# Patient Record
Sex: MALE | Race: WHITE | NOT HISPANIC OR LATINO | Employment: FULL TIME | URBAN - METROPOLITAN AREA
[De-identification: names, ages, dates, MRNs, and addresses within clinical notes are randomized per-mention and may not be internally consistent; named-entity substitution may affect disease eponyms.]

---

## 2017-04-24 ENCOUNTER — APPOINTMENT (EMERGENCY)
Dept: CT IMAGING | Facility: HOSPITAL | Age: 62
End: 2017-04-24
Payer: COMMERCIAL

## 2017-04-24 ENCOUNTER — HOSPITAL ENCOUNTER (EMERGENCY)
Facility: HOSPITAL | Age: 62
Discharge: HOME | End: 2017-04-24
Admitting: EMERGENCY MEDICINE
Payer: COMMERCIAL

## 2017-04-24 VITALS
WEIGHT: 190 LBS | BODY MASS INDEX: 25.18 KG/M2 | HEART RATE: 76 BPM | RESPIRATION RATE: 20 BRPM | HEIGHT: 73 IN | OXYGEN SATURATION: 99 % | TEMPERATURE: 96.9 F | SYSTOLIC BLOOD PRESSURE: 167 MMHG | DIASTOLIC BLOOD PRESSURE: 77 MMHG

## 2017-04-24 DIAGNOSIS — R11.2 NAUSEA AND VOMITING: ICD-10-CM

## 2017-04-24 DIAGNOSIS — R80.9 PROTEINURIA: ICD-10-CM

## 2017-04-24 DIAGNOSIS — R74.01 TRANSAMINITIS: ICD-10-CM

## 2017-04-24 DIAGNOSIS — N39.0 UTI (URINARY TRACT INFECTION): Primary | ICD-10-CM

## 2017-04-24 LAB
ALBUMIN SERPL BCP-MCNC: 3.9 G/DL (ref 3.5–5)
ALP SERPL-CCNC: 90 U/L (ref 46–116)
ALT SERPL W P-5'-P-CCNC: 116 U/L (ref 12–78)
ANION GAP SERPL CALCULATED.3IONS-SCNC: 15 MMOL/L (ref 4–13)
AST SERPL W P-5'-P-CCNC: 102 U/L (ref 5–45)
BASOPHILS # BLD AUTO: 0.04 THOUSANDS/ΜL (ref 0–0.1)
BASOPHILS NFR BLD AUTO: 0 % (ref 0–1)
BILIRUB SERPL-MCNC: 1.6 MG/DL (ref 0.2–1)
BUN SERPL-MCNC: 11 MG/DL (ref 5–25)
CALCIUM SERPL-MCNC: 9.3 MG/DL (ref 8.3–10.1)
CHLORIDE SERPL-SCNC: 100 MMOL/L (ref 100–108)
CLARITY, POC: CLEAR
CO2 SERPL-SCNC: 24 MMOL/L (ref 21–32)
COLOR, POC: YELLOW
CREAT SERPL-MCNC: 1.34 MG/DL (ref 0.6–1.3)
EOSINOPHIL # BLD AUTO: 0 THOUSAND/ΜL (ref 0–0.61)
EOSINOPHIL NFR BLD AUTO: 0 % (ref 0–6)
ERYTHROCYTE [DISTWIDTH] IN BLOOD BY AUTOMATED COUNT: 14.5 % (ref 11.6–15.1)
EXT BILIRUBIN, UA: NEGATIVE
EXT BLOOD URINE: NEGATIVE
EXT GLUCOSE, UA: NEGATIVE
EXT KETONES: NORMAL
EXT NITRITE, UA: NEGATIVE
EXT PH, UA: 8.5
EXT PROTEIN, UA: NORMAL
EXT SPECIFIC GRAVITY, UA: 1
EXT UROBILINOGEN: 0.2
GFR SERPL CREATININE-BSD FRML MDRD: 54.6 ML/MIN/1.73SQ M
GLUCOSE SERPL-MCNC: 144 MG/DL (ref 65–140)
HCT VFR BLD AUTO: 47.7 % (ref 36.5–49.3)
HGB BLD-MCNC: 16.7 G/DL (ref 12–17)
LIPASE SERPL-CCNC: 184 U/L (ref 73–393)
LYMPHOCYTES # BLD AUTO: 0.74 THOUSANDS/ΜL (ref 0.6–4.47)
LYMPHOCYTES NFR BLD AUTO: 7 % (ref 14–44)
MCH RBC QN AUTO: 31.9 PG (ref 26.8–34.3)
MCHC RBC AUTO-ENTMCNC: 35 G/DL (ref 31.4–37.4)
MCV RBC AUTO: 91 FL (ref 82–98)
MONOCYTES # BLD AUTO: 0.55 THOUSAND/ΜL (ref 0.17–1.22)
MONOCYTES NFR BLD AUTO: 6 % (ref 4–12)
NEUTROPHILS # BLD AUTO: 8.65 THOUSANDS/ΜL (ref 1.85–7.62)
NEUTS SEG NFR BLD AUTO: 87 % (ref 43–75)
PLATELET # BLD AUTO: 284 THOUSANDS/UL (ref 149–390)
PMV BLD AUTO: 9.4 FL (ref 8.9–12.7)
POTASSIUM SERPL-SCNC: 3.5 MMOL/L (ref 3.5–5.3)
PROT SERPL-MCNC: 8.6 G/DL (ref 6.4–8.2)
RBC # BLD AUTO: 5.23 MILLION/UL (ref 3.88–5.62)
SODIUM SERPL-SCNC: 139 MMOL/L (ref 136–145)
WBC # BLD AUTO: 9.98 THOUSAND/UL (ref 4.31–10.16)
WBC # BLD EST: NORMAL 10*3/UL

## 2017-04-24 PROCEDURE — 83690 ASSAY OF LIPASE: CPT | Performed by: EMERGENCY MEDICINE

## 2017-04-24 PROCEDURE — 99284 EMERGENCY DEPT VISIT MOD MDM: CPT

## 2017-04-24 PROCEDURE — 85025 COMPLETE CBC W/AUTO DIFF WBC: CPT | Performed by: EMERGENCY MEDICINE

## 2017-04-24 PROCEDURE — 74177 CT ABD & PELVIS W/CONTRAST: CPT

## 2017-04-24 PROCEDURE — 81002 URINALYSIS NONAUTO W/O SCOPE: CPT | Performed by: PHYSICIAN ASSISTANT

## 2017-04-24 PROCEDURE — 96361 HYDRATE IV INFUSION ADD-ON: CPT

## 2017-04-24 PROCEDURE — 96374 THER/PROPH/DIAG INJ IV PUSH: CPT

## 2017-04-24 PROCEDURE — 36415 COLL VENOUS BLD VENIPUNCTURE: CPT | Performed by: EMERGENCY MEDICINE

## 2017-04-24 PROCEDURE — 93005 ELECTROCARDIOGRAM TRACING: CPT | Performed by: PHYSICIAN ASSISTANT

## 2017-04-24 PROCEDURE — 80053 COMPREHEN METABOLIC PANEL: CPT | Performed by: EMERGENCY MEDICINE

## 2017-04-24 RX ORDER — ONDANSETRON 4 MG/1
4 TABLET, ORALLY DISINTEGRATING ORAL EVERY 8 HOURS PRN
Qty: 15 TABLET | Refills: 0 | Status: SHIPPED | OUTPATIENT
Start: 2017-04-24 | End: 2018-02-02

## 2017-04-24 RX ORDER — ONDANSETRON 2 MG/ML
4 INJECTION INTRAMUSCULAR; INTRAVENOUS ONCE AS NEEDED
Status: COMPLETED | OUTPATIENT
Start: 2017-04-24 | End: 2017-04-24

## 2017-04-24 RX ORDER — CIPROFLOXACIN 500 MG/1
500 TABLET, FILM COATED ORAL 2 TIMES DAILY
Qty: 14 TABLET | Refills: 0 | Status: SHIPPED | OUTPATIENT
Start: 2017-04-24 | End: 2017-05-01

## 2017-04-24 RX ADMIN — ONDANSETRON 4 MG: 2 INJECTION INTRAMUSCULAR; INTRAVENOUS at 12:25

## 2017-04-24 RX ADMIN — SODIUM CHLORIDE 500 ML: 0.9 INJECTION, SOLUTION INTRAVENOUS at 12:25

## 2017-04-24 RX ADMIN — IODIXANOL 100 ML: 320 INJECTION, SOLUTION INTRAVASCULAR at 14:01

## 2017-04-24 RX ADMIN — SODIUM CHLORIDE 1000 ML: 0.9 INJECTION, SOLUTION INTRAVENOUS at 12:25

## 2017-04-25 LAB
ATRIAL RATE: 97 BPM
P AXIS: 80 DEGREES
PR INTERVAL: 170 MS
QRS AXIS: 76 DEGREES
QRSD INTERVAL: 100 MS
QT INTERVAL: 394 MS
QTC INTERVAL: 500 MS
T WAVE AXIS: 82 DEGREES
VENTRICULAR RATE: 97 BPM

## 2018-02-02 ENCOUNTER — HOSPITAL ENCOUNTER (INPATIENT)
Facility: HOSPITAL | Age: 63
LOS: 7 days | Discharge: OTHER INSTITUTION | DRG: 896 | End: 2018-02-10
Attending: EMERGENCY MEDICINE | Admitting: INTERNAL MEDICINE
Payer: COMMERCIAL

## 2018-02-02 DIAGNOSIS — E87.6 HYPOKALEMIA: ICD-10-CM

## 2018-02-02 DIAGNOSIS — F32.A DEPRESSION: ICD-10-CM

## 2018-02-02 DIAGNOSIS — F10.239 ALCOHOL WITHDRAWAL (HCC): Primary | ICD-10-CM

## 2018-02-02 DIAGNOSIS — F10.230 ALCOHOL WITHDRAWAL SYNDROME WITHOUT COMPLICATION (HCC): ICD-10-CM

## 2018-02-02 DIAGNOSIS — K59.00 CONSTIPATION: ICD-10-CM

## 2018-02-02 DIAGNOSIS — R74.8 ELEVATED LIVER ENZYMES: ICD-10-CM

## 2018-02-02 DIAGNOSIS — R51.9 HEADACHE: ICD-10-CM

## 2018-02-02 LAB
ALBUMIN SERPL BCP-MCNC: 3.5 G/DL (ref 3.5–5)
ALP SERPL-CCNC: 110 U/L (ref 46–116)
ALT SERPL W P-5'-P-CCNC: 130 U/L (ref 12–78)
AMPHETAMINES SERPL QL SCN: NEGATIVE
ANION GAP SERPL CALCULATED.3IONS-SCNC: 12 MMOL/L (ref 4–13)
AST SERPL W P-5'-P-CCNC: 207 U/L (ref 5–45)
BARBITURATES UR QL: NEGATIVE
BASOPHILS # BLD AUTO: 0.04 THOUSANDS/ΜL (ref 0–0.1)
BASOPHILS NFR BLD AUTO: 1 % (ref 0–1)
BENZODIAZ UR QL: NEGATIVE
BILIRUB SERPL-MCNC: 1.85 MG/DL (ref 0.2–1)
BUN SERPL-MCNC: 8 MG/DL (ref 5–25)
CALCIUM SERPL-MCNC: 8.9 MG/DL (ref 8.3–10.1)
CHLORIDE SERPL-SCNC: 103 MMOL/L (ref 100–108)
CO2 SERPL-SCNC: 24 MMOL/L (ref 21–32)
COCAINE UR QL: NEGATIVE
CREAT SERPL-MCNC: 1.08 MG/DL (ref 0.6–1.3)
EOSINOPHIL # BLD AUTO: 0 THOUSAND/ΜL (ref 0–0.61)
EOSINOPHIL NFR BLD AUTO: 0 % (ref 0–6)
ERYTHROCYTE [DISTWIDTH] IN BLOOD BY AUTOMATED COUNT: 13.9 % (ref 11.6–15.1)
ETHANOL EXG-MCNC: 0 MG/DL
GFR SERPL CREATININE-BSD FRML MDRD: 74 ML/MIN/1.73SQ M
GLUCOSE SERPL-MCNC: 112 MG/DL (ref 65–140)
HCT VFR BLD AUTO: 43.5 % (ref 36.5–49.3)
HGB BLD-MCNC: 15.7 G/DL (ref 12–17)
LYMPHOCYTES # BLD AUTO: 0.44 THOUSANDS/ΜL (ref 0.6–4.47)
LYMPHOCYTES NFR BLD AUTO: 7 % (ref 14–44)
MAGNESIUM SERPL-MCNC: 1.8 MG/DL (ref 1.6–2.6)
MCH RBC QN AUTO: 34.4 PG (ref 26.8–34.3)
MCHC RBC AUTO-ENTMCNC: 36.1 G/DL (ref 31.4–37.4)
MCV RBC AUTO: 95 FL (ref 82–98)
METHADONE UR QL: NEGATIVE
MONOCYTES # BLD AUTO: 1.22 THOUSAND/ΜL (ref 0.17–1.22)
MONOCYTES NFR BLD AUTO: 18 % (ref 4–12)
NEUTROPHILS # BLD AUTO: 4.97 THOUSANDS/ΜL (ref 1.85–7.62)
NEUTS SEG NFR BLD AUTO: 74 % (ref 43–75)
NRBC BLD AUTO-RTO: 0 /100 WBCS
OPIATES UR QL SCN: NEGATIVE
PCP UR QL: NEGATIVE
PHOSPHATE SERPL-MCNC: 1.8 MG/DL (ref 2.3–4.1)
PLATELET # BLD AUTO: 160 THOUSANDS/UL (ref 149–390)
PMV BLD AUTO: 9.7 FL (ref 8.9–12.7)
POTASSIUM SERPL-SCNC: 3.7 MMOL/L (ref 3.5–5.3)
PROT SERPL-MCNC: 8.1 G/DL (ref 6.4–8.2)
RBC # BLD AUTO: 4.57 MILLION/UL (ref 3.88–5.62)
SODIUM SERPL-SCNC: 139 MMOL/L (ref 136–145)
THC UR QL: NEGATIVE
WBC # BLD AUTO: 6.7 THOUSAND/UL (ref 4.31–10.16)

## 2018-02-02 PROCEDURE — 83735 ASSAY OF MAGNESIUM: CPT | Performed by: STUDENT IN AN ORGANIZED HEALTH CARE EDUCATION/TRAINING PROGRAM

## 2018-02-02 PROCEDURE — 99284 EMERGENCY DEPT VISIT MOD MDM: CPT

## 2018-02-02 PROCEDURE — 96361 HYDRATE IV INFUSION ADD-ON: CPT

## 2018-02-02 PROCEDURE — 99220 PR INITIAL OBSERVATION CARE/DAY 70 MINUTES: CPT | Performed by: INTERNAL MEDICINE

## 2018-02-02 PROCEDURE — 85025 COMPLETE CBC W/AUTO DIFF WBC: CPT | Performed by: STUDENT IN AN ORGANIZED HEALTH CARE EDUCATION/TRAINING PROGRAM

## 2018-02-02 PROCEDURE — 84100 ASSAY OF PHOSPHORUS: CPT | Performed by: STUDENT IN AN ORGANIZED HEALTH CARE EDUCATION/TRAINING PROGRAM

## 2018-02-02 PROCEDURE — 36415 COLL VENOUS BLD VENIPUNCTURE: CPT | Performed by: STUDENT IN AN ORGANIZED HEALTH CARE EDUCATION/TRAINING PROGRAM

## 2018-02-02 PROCEDURE — 82075 ASSAY OF BREATH ETHANOL: CPT | Performed by: EMERGENCY MEDICINE

## 2018-02-02 PROCEDURE — 93005 ELECTROCARDIOGRAM TRACING: CPT

## 2018-02-02 PROCEDURE — 80053 COMPREHEN METABOLIC PANEL: CPT | Performed by: STUDENT IN AN ORGANIZED HEALTH CARE EDUCATION/TRAINING PROGRAM

## 2018-02-02 PROCEDURE — 80307 DRUG TEST PRSMV CHEM ANLYZR: CPT | Performed by: EMERGENCY MEDICINE

## 2018-02-02 PROCEDURE — 96374 THER/PROPH/DIAG INJ IV PUSH: CPT

## 2018-02-02 RX ORDER — CHLORDIAZEPOXIDE HYDROCHLORIDE 25 MG/1
25 CAPSULE, GELATIN COATED ORAL EVERY 6 HOURS PRN
Status: DISCONTINUED | OUTPATIENT
Start: 2018-02-02 | End: 2018-02-03

## 2018-02-02 RX ORDER — DEXTROSE AND SODIUM CHLORIDE 5; .9 G/100ML; G/100ML
125 INJECTION, SOLUTION INTRAVENOUS CONTINUOUS
Status: DISCONTINUED | OUTPATIENT
Start: 2018-02-02 | End: 2018-02-06

## 2018-02-02 RX ORDER — DIAZEPAM 5 MG/ML
5 INJECTION, SOLUTION INTRAMUSCULAR; INTRAVENOUS ONCE
Status: COMPLETED | OUTPATIENT
Start: 2018-02-02 | End: 2018-02-02

## 2018-02-02 RX ORDER — ONDANSETRON 2 MG/ML
4 INJECTION INTRAMUSCULAR; INTRAVENOUS EVERY 6 HOURS PRN
Status: DISCONTINUED | OUTPATIENT
Start: 2018-02-02 | End: 2018-02-10 | Stop reason: HOSPADM

## 2018-02-02 RX ORDER — ACETAMINOPHEN 325 MG/1
650 TABLET ORAL EVERY 6 HOURS PRN
Status: DISCONTINUED | OUTPATIENT
Start: 2018-02-02 | End: 2018-02-10 | Stop reason: HOSPADM

## 2018-02-02 RX ORDER — SIMETHICONE 80 MG
80 TABLET,CHEWABLE ORAL 4 TIMES DAILY PRN
Status: DISCONTINUED | OUTPATIENT
Start: 2018-02-02 | End: 2018-02-10 | Stop reason: HOSPADM

## 2018-02-02 RX ORDER — METOPROLOL TARTRATE 5 MG/5ML
5 INJECTION INTRAVENOUS EVERY 6 HOURS PRN
Status: DISCONTINUED | OUTPATIENT
Start: 2018-02-02 | End: 2018-02-10 | Stop reason: HOSPADM

## 2018-02-02 RX ORDER — CHLORDIAZEPOXIDE HYDROCHLORIDE 25 MG/1
25 CAPSULE, GELATIN COATED ORAL EVERY 6 HOURS SCHEDULED
Status: DISCONTINUED | OUTPATIENT
Start: 2018-02-02 | End: 2018-02-03

## 2018-02-02 RX ORDER — THIAMINE MONONITRATE (VIT B1) 100 MG
100 TABLET ORAL ONCE
Status: COMPLETED | OUTPATIENT
Start: 2018-02-02 | End: 2018-02-02

## 2018-02-02 RX ORDER — LANOLIN ALCOHOL/MO/W.PET/CERES
400 CREAM (GRAM) TOPICAL ONCE
Status: COMPLETED | OUTPATIENT
Start: 2018-02-02 | End: 2018-02-02

## 2018-02-02 RX ORDER — DIAZEPAM 5 MG/1
5 TABLET ORAL ONCE
Status: DISCONTINUED | OUTPATIENT
Start: 2018-02-02 | End: 2018-02-02

## 2018-02-02 RX ORDER — SENNOSIDES 8.8 MG/5ML
8.8 LIQUID ORAL DAILY
Status: DISCONTINUED | OUTPATIENT
Start: 2018-02-03 | End: 2018-02-10 | Stop reason: HOSPADM

## 2018-02-02 RX ORDER — FOLIC ACID 1 MG/1
1 TABLET ORAL DAILY
Status: DISCONTINUED | OUTPATIENT
Start: 2018-02-03 | End: 2018-02-10 | Stop reason: HOSPADM

## 2018-02-02 RX ADMIN — CHLORDIAZEPOXIDE HYDROCHLORIDE 25 MG: 25 CAPSULE ORAL at 23:37

## 2018-02-02 RX ADMIN — Medication 100 MG: at 14:59

## 2018-02-02 RX ADMIN — METOPROLOL TARTRATE 5 MG: 1 INJECTION, SOLUTION INTRAVENOUS at 20:30

## 2018-02-02 RX ADMIN — Medication 400 MCG: at 14:59

## 2018-02-02 RX ADMIN — SODIUM CHLORIDE 1000 ML: 0.9 INJECTION, SOLUTION INTRAVENOUS at 14:59

## 2018-02-02 RX ADMIN — CHLORDIAZEPOXIDE HYDROCHLORIDE 25 MG: 25 CAPSULE ORAL at 20:29

## 2018-02-02 RX ADMIN — POTASSIUM PHOSPHATE, MONOBASIC AND POTASSIUM PHOSPHATE, DIBASIC 30 MMOL: 224; 236 INJECTION, SOLUTION INTRAVENOUS at 18:10

## 2018-02-02 RX ADMIN — DIAZEPAM 5 MG: 5 INJECTION, SOLUTION INTRAMUSCULAR; INTRAVENOUS at 14:59

## 2018-02-02 RX ADMIN — ACETAMINOPHEN 650 MG: 325 TABLET, FILM COATED ORAL at 20:29

## 2018-02-02 RX ADMIN — DEXTROSE AND SODIUM CHLORIDE 125 ML/HR: 5; .9 INJECTION, SOLUTION INTRAVENOUS at 23:37

## 2018-02-02 NOTE — ED PROVIDER NOTES
History  Chief Complaint   Patient presents with    Psychiatric Evaluation     Pts family states that pt made a comment about blowing his head off to his best friend on Monday   were called and pt states that he made the comment out of anger  Per mother all firearms were taking out of the house however pt told EMS that there is still one in the house  Pt has been a heavy drinker over the last few weeks and denies detoxing at this time but is very shaky and anxious  Mother is concerned about liver enzymes due to drinking  This is a 56-year-old male with a past medical history of alcoholism who presents to the emergency room this afternoon with withdrawal like symptoms and recent history of suicidal ideation  Patient states that he has been on medical leave for the past three weeks because of his drinking and he failed a drug test last week testing positive for marijuana  Patient went into work today and they told him that he needed to complete another drug test so he went to [address] [place] [id number] for the drug test, which was normal but they noticed that he was shaking and with his history of drinking they decided it would be best to be seen in the emergency room  Patient states that his last drink was last evening sometime and he usually drinks 3-4 beers per night  However, the mom and the nephew who are at bedside state that he typically drinks until he passes out most nights  Patient states that he has never had seizures in the past but will often get the shakes because without drinking  Patient denies any medical history and does not take any medications  Patient answers yes to two of the four cage questions  in regards to the patient's suicidal ideation, he made a comment to his friend a few days ago that he wanted to "blow his brains out "   At that time, the  were called who came to evaluate the patient and the patient stated that he said this out of anger and did not mean it    The  searched his house at that time and found no weapons  Patient states that he gave all his guns to his son  The patient currently denies any suicidal ideations or homicidal ideations  Patient states that he has never attempted suicide in the past, he has never been admitted for mental health reasons, and no one close to home as ever completed suicide  None       History reviewed  No pertinent past medical history  History reviewed  No pertinent surgical history  History reviewed  No pertinent family history  I have reviewed and agree with the history as documented  Social History   Substance Use Topics    Smoking status: Former Smoker    Smokeless tobacco: Never Used    Alcohol use 1 2 oz/week     2 Cans of beer per week        Review of Systems   Constitutional: Negative for chills, diaphoresis and fever  HENT: Negative for congestion, rhinorrhea, sinus pressure and sore throat  Eyes: Negative for visual disturbance  Respiratory: Negative for cough, chest tightness and shortness of breath  Cardiovascular: Negative for chest pain  Gastrointestinal: Negative for abdominal pain, constipation, diarrhea, nausea and vomiting  Genitourinary: Negative for dysuria, frequency, hematuria and urgency  Musculoskeletal: Negative for arthralgias and myalgias  Skin: Negative for color change and rash  Neurological: Positive for tremors  Negative for dizziness, seizures, numbness and headaches  Psychiatric/Behavioral: Negative for behavioral problems, confusion and hallucinations  The patient is nervous/anxious          Physical Exam  ED Triage Vitals [02/02/18 1357]   Temperature Pulse Respirations Blood Pressure SpO2   98 7 °F (37 1 °C) (!) 114 20 (!) 162/101 98 %      Temp Source Heart Rate Source Patient Position - Orthostatic VS BP Location FiO2 (%)   Oral Monitor Lying Left arm --      Pain Score       No Pain           Orthostatic Vital Signs  Vitals:    02/02/18 1530 02/02/18 1810 02/02/18 1952 02/02/18 2008   BP: 138/89 154/98 (!) 172/108 (!) 186/99   Pulse: (!) 115 (!) 120 105 (!) 108   Patient Position - Orthostatic VS: Lying Lying  Lying       Physical Exam   Constitutional: He is oriented to person, place, and time  He appears well-developed and well-nourished  No distress  HENT:   Head: Normocephalic and atraumatic  Eyes: Conjunctivae are normal  Pupils are equal, round, and reactive to light  Cardiovascular: Normal rate, regular rhythm and normal heart sounds  Exam reveals no gallop and no friction rub  No murmur heard  Pulmonary/Chest: Effort normal and breath sounds normal  No respiratory distress  He has no wheezes  He has no rales  Abdominal: Soft  Bowel sounds are normal  He exhibits no distension  There is no tenderness  There is no guarding  Musculoskeletal: Normal range of motion  Neurological: He is alert and oriented to person, place, and time  He displays tremor  GCS eye subscore is 4  GCS verbal subscore is 5  GCS motor subscore is 6  Patient with a resting tremor and bilateral upper extremities  Tongue fasciculations also present  Skin: Skin is warm and dry  Psychiatric: He has a normal mood and affect  His behavior is normal    Nursing note and vitals reviewed        ED Medications  Medications   potassium phosphate 30 mmol in sodium chloride 0 9 % 250 mL infusion (30 mmol Intravenous New Bag 2/2/18 1810)   simethicone (MYLICON) chewable tablet 80 mg (not administered)   ondansetron (ZOFRAN) injection 4 mg (not administered)   senna 8 8 mg/5 mL oral syrup 8 8 mg (not administered)   acetaminophen (TYLENOL) tablet 650 mg (650 mg Oral Given 2/2/18 2029)   chlordiazePOXIDE (LIBRIUM) capsule 25 mg (25 mg Oral Given 2/2/18 2029)   chlordiazePOXIDE (LIBRIUM) capsule 25 mg (not administered)   metoprolol (LOPRESSOR) injection 5 mg (5 mg Intravenous Given 2/2/18 2030)   thiamine (VITAMIN B1) 100 mg in sodium chloride 0 9 % 50 mL IVPB (not administered)   folic acid (FOLVITE) tablet 1 mg (not administered)   dextrose 5 % and sodium chloride 0 9 % infusion (not administered)   sodium chloride 0 9 % bolus 1,000 mL (1,000 mL Intravenous New Bag 2/2/18 1459)   thiamine (VITAMIN B1) tablet 100 mg (100 mg Oral Given 4/1/91 6688)   folic acid (FOLVITE) tablet 400 mcg (400 mcg Oral Given 2/2/18 1459)   diazepam (VALIUM) injection 5 mg (5 mg Intravenous Given 2/2/18 1459)       Diagnostic Studies  Results Reviewed     Procedure Component Value Units Date/Time    Comprehensive metabolic panel [[id number]]  (Abnormal) Collected:  02/02/18 1454    Lab Status:  Final result Specimen:  Blood from Arm, Left Updated:  02/02/18 1525     Sodium 139 mmol/L      Potassium 3 7 mmol/L      Chloride 103 mmol/L      CO2 24 mmol/L      Anion Gap 12 mmol/L      BUN 8 mg/dL      Creatinine 1 08 mg/dL      Glucose 112 mg/dL      Calcium 8 9 mg/dL       (H) U/L       (H) U/L      Alkaline Phosphatase 110 U/L      Total Protein 8 1 g/dL      Albumin 3 5 g/dL      Total Bilirubin 1 85 (H) mg/dL      eGFR 74 ml/min/1 73sq m     Narrative:         National Kidney Disease Education Program recommendations are as follows:  GFR calculation is accurate only with a steady state creatinine  Chronic Kidney disease less than 60 ml/min/1 73 sq  meters  Kidney failure less than 15 ml/min/1 73 sq  meters      Phosphorus [[id number]]  (Abnormal) Collected:  02/02/18 1454    Lab Status:  Final result Specimen:  Blood from Arm, Left Updated:  02/02/18 1525     Phosphorus 1 8 (L) mg/dL     Magnesium [[id number]]  (Normal) Collected:  02/02/18 1454    Lab Status:  Final result Specimen:  Blood from Arm, Left Updated:  02/02/18 1525     Magnesium 1 8 mg/dL     CBC and differential [[id number]]  (Abnormal) Collected:  02/02/18 1454    Lab Status:  Final result Specimen:  Blood from Arm, Left Updated:  02/02/18 1505     WBC 6 70 Thousand/uL      RBC 4 57 Million/uL      Hemoglobin 15 7 g/dL Hematocrit 43 5 %      MCV 95 fL      MCH 34 4 (H) pg      MCHC 36 1 g/dL      RDW 13 9 %      MPV 9 7 fL      Platelets 336 Thousands/uL      nRBC 0 /100 WBCs      Neutrophils Relative 74 %      Lymphocytes Relative 7 (L) %      Monocytes Relative 18 (H) %      Eosinophils Relative 0 %      Basophils Relative 1 %      Neutrophils Absolute 4 97 Thousands/µL      Lymphocytes Absolute 0 44 (L) Thousands/µL      Monocytes Absolute 1 22 Thousand/µL      Eosinophils Absolute 0 00 Thousand/µL      Basophils Absolute 0 04 Thousands/µL     Rapid drug screen, urine [[id number]]  (Normal) Collected:  02/02/18 1411    Lab Status:  Final result Specimen:  Urine from Urine, Other Updated:  02/02/18 1431     Amph/Meth UR Negative     Barbiturate Ur Negative     Benzodiazepine Urine Negative     Cocaine Urine Negative     Methadone Urine Negative     Opiate Urine Negative     PCP Ur Negative     THC Urine Negative    Narrative:         FOR MEDICAL PURPOSES ONLY  IF CONFIRMATION NEEDED PLEASE CONTACT THE LAB WITHIN 5 DAYS      Drug Screen Cutoff Levels:  AMPHETAMINE/METHAMPHETAMINES  1000 ng/mL  BARBITURATES     200 ng/mL  BENZODIAZEPINES     200 ng/mL  COCAINE      300 ng/mL  METHADONE      300 ng/mL  OPIATES      300 ng/mL  PHENCYCLIDINE     25 ng/mL  THC       50 ng/mL    POCT alcohol breath test [[id number]]  (Normal) Resulted:  02/02/18 1403    Lab Status:  Final result Updated:  02/02/18 1404     EXTBreath Alcohol 0 000                 No orders to display         Procedures  ECG 12 Lead Documentation  Date/Time: 2/2/2018 10:30 PM  Performed by: Magaly Gauthier  Authorized by: Sanchez Keene     Indications / Diagnosis:  Alcohol withdrawal  ECG reviewed by me, the ED Provider: yes    Patient location:  ED  Previous ECG:     Previous ECG:  Compared to current    Similarity:  No change    Comparison to cardiac monitor: No    Interpretation:     Interpretation: normal    Quality:     Tracing quality:  Limited by artifact  Rate:     ECG rate assessment: normal    Rhythm:     Rhythm: sinus rhythm    Ectopy:     Ectopy: none    QRS:     QRS axis:  Normal    QRS intervals:  Normal  Conduction:     Conduction: normal    ST segments:     ST segments:  Normal  T waves:     T waves: normal            Phone Consults  ED Phone Contact    ED Course  ED Course                                MDM  Number of Diagnoses or Management Options  Alcohol withdrawal [facility]):   Diagnosis management comments: Patient's CIWA score 9-10, given 5mg valium IV and saw a large improvement in his symptoms  Patient given folate and thiamine supplements po and his phosphorus was repleted with [facility] IVPB  Spoke with SLIM who agreed to admit the patient as an obs  CritCare Time    Disposition  Final diagnoses:   Alcohol withdrawal ([facility] [address] )     Time reflects when diagnosis was documented in both MDM as applicable and the Disposition within this note     Time User Action Codes Description Comment    2/2/2018  4:14 PM Evelin Killian Add [F10 239] Alcohol withdrawal ([facility] [address] )     2/2/2018  4:48 PM Leanne Sutherland Add [F32 9] Depression       ED Disposition     ED Disposition Condition Comment    Admit  Case was discussed with ELOISA and the patient's admission status was agreed to be Admission Status: observation status to the service of Dr Mary Cosme  Follow-up Information    None       There are no discharge medications for this patient  No discharge procedures on file  ED Provider  Attending physically available and evaluated Nickolas Weldon I managed the patient along with the ED Attending      Electronically Signed by         Dustin Young MD  02/02/18 7878       Dustin Young MD  02/02/18 7393

## 2018-02-02 NOTE — ED ATTENDING ATTESTATION
Valentine Morton MD, saw and evaluated the patient  I have discussed the patient with the resident/non-physician practitioner and agree with the resident's/non-physician practitioner's findings, Plan of Care, and MDM as documented in the resident's/non-physician practitioner's note, except where noted  All available labs and Radiology studies were reviewed  At this point I agree with the current assessment done in the Emergency Department  I have conducted an independent evaluation of this patient a history and physical is as follows:      Critical Care Time  CritCare Time    Procedures     64 yo male stated he wanted to blow his head off five days ago and  came to house and pt had no guns and pt stated he was just angry  Pt now put on medical leave for alcohol abuse and marijauna use  Pt went for drug test today at work and was shaky  Pt with last alcohol use yesterday  Pt sent in for shakiness  No si or hi currently  No pmh  Pt with depression  Pt here with his mother and nephew  Pt   Vss, tachy, anxious, shaking tremor, lungs cta, rrr, abdomen soft nontender, no neuro deficits    Labs, ivf, benzos

## 2018-02-02 NOTE — ED NOTES
Pt has 2 other family members in the waiting room that state that pt is very forgetful and that they would like to be included to make sure that pt is truthful about what is going on  Pt states that family brought him here after failing a drug test for work at [employer] [facility] [employer] [facility] [address]        Mark Robbins RN  02/02/18 1400

## 2018-02-02 NOTE — ASSESSMENT & PLAN NOTE
· Will start her on a clock Librium with p r n   · IV beta-blocker as needed  · IV fluids  · thiamine and folate  · Will get psychiatry evaluation for alcohol withdrawal counseling also patient with evidence of depression and suicidal ideation last week    Currently denies any suicidal ideations to me

## 2018-02-02 NOTE — H&P
H&P- Rita Arias 1955, 63 y o  male MRN: [id number]    Unit/Bed#: Femi Berry Encounter: [id number]    Primary Care Provider: Omid Flynn DO   Date and time admitted to hospital: 2/2/2018  1:39 PM        * Alcohol withdrawal syndrome without complication ([facility] [address] )   Assessment & Plan    · Will start her on a clock Librium with p r n   · IV beta-blocker as needed  · IV fluids  · thiamine and folate  · Will get psychiatry evaluation for alcohol withdrawal counseling also patient with evidence of depression and suicidal ideation last week  Currently denies any suicidal ideations to me          VTE Prophylaxis: Low risk  / reason for no mechanical VTE prophylaxis Low risk   Code Status:  Full code  POLST: POLST form is not discussed and not completed at this time  Discussion with family:  Patient's mother at bedside    Anticipated Length of Stay:  Patient will be admitted on an Observation basis with an anticipated length of stay of  less than 2 midnights  Justification for Hospital Stay:  Patient to be treated for acute alcohol withdrawal syndrome possible discharge in a   if stable    Total Time for Visit, including Counseling / Coordination of Care: 30 minutes  Greater than 50% of this total time spent on direct patient counseling and coordination of care  Chief Complaint:   My job sent me here    History of Present Illness:    Rita Arias is a 63 y o  male who presents with alcohol withdrawal   Patient is at work and was randomly drug tested with positive urine tox for marijuana  He was sent to [facility] was noted to be tremulous and diaphoretic  Patient admits to drinking at least 4 beers a day patient's mother reports he also drinks hard liquor  Patient has had no alcohol for at least 36 hours  He denies feeling tremulous but was given Valium in the ER for tremors and is feeling better at this time    Patient also reportedly expressed suicidal ideation but a week ago however he is denying any suicidal homicidal ideation at this time    Review of Systems:    Review of Systems   Constitutional: Negative for fatigue and fever  HENT: Negative for sore throat  Eyes: Negative for visual disturbance  Respiratory: Negative for cough, choking, shortness of breath, wheezing and stridor  Cardiovascular: Negative for chest pain, palpitations and leg swelling  Gastrointestinal: Negative for abdominal pain, diarrhea, nausea and vomiting  Endocrine: Negative for polyphagia and polyuria  Genitourinary: Negative for frequency and urgency  Musculoskeletal: Negative for arthralgias and back pain  Neurological: Negative for tremors, seizures and weakness  Hematological: Negative for adenopathy  Psychiatric/Behavioral: Negative for agitation and confusion  Past Medical and Surgical History:     History reviewed  No pertinent past medical history  History reviewed  No pertinent surgical history  Meds/Allergies:    Prior to Admission medications    Medication Sig Start Date End Date Taking? Authorizing Provider   ondansetron (ZOFRAN-ODT) 4 mg disintegrating tablet Take 1 tablet by mouth every 8 (eight) hours as needed for vomiting 4/24/17   Josep Palomares, DO     I have reviewed home medications with patient personally  Allergies: No Known Allergies    Social History:     Marital Status: Single   Occupation:   Patient Pre-hospital Living Situation:   Patient Pre-hospital Level of Mobility:   Patient Pre-hospital Diet Restrictions:   Substance Use History:   History   Alcohol Use    1 2 oz/week    2 Cans of beer per week     History   Smoking Status    Former Smoker   Smokeless Tobacco    Never Used     History   Drug Use    Types: Marijuana       Family History:    History reviewed  No pertinent family history      Physical Exam:     Vitals:   Blood Pressure: (!) 162/101 (02/02/18 1357)  Pulse: (!) 114 (02/02/18 1357)  Temperature: 98 7 °F (37 1 °C) (02/02/18 1357)  Temp Source: Oral (02/02/18 1357)  Respirations: 20 (02/02/18 1357)  Weight - Scale: 81 6 kg (180 lb) (02/02/18 1357)  SpO2: 98 % (02/02/18 1357)    Physical Exam   Constitutional: He is oriented to person, place, and time  No distress  Eyes: Right eye exhibits no discharge  Left eye exhibits no discharge  No scleral icterus  Neck: Normal range of motion  No JVD present  Cardiovascular: Normal rate  Exam reveals no gallop and no friction rub  No murmur heard  Pulmonary/Chest: Effort normal  No respiratory distress  He has no wheezes  He has no rales  Abdominal: Soft  He exhibits no distension  There is no tenderness  There is no rebound and no guarding  Musculoskeletal: Normal range of motion  He exhibits no edema  Lymphadenopathy:     He has no cervical adenopathy  Neurological: He is alert and oriented to person, place, and time  No cranial nerve deficit  Positive resting tremor    Skin: Skin is warm and dry  He is not diaphoretic  Additional Data:     Lab Results: I have personally reviewed pertinent reports  Results from last 7 days  Lab Units 02/02/18  1454   WBC Thousand/uL 6 70   HEMOGLOBIN g/dL 15 7   HEMATOCRIT % 43 5   PLATELETS Thousands/uL 160   NEUTROS PCT % 74   LYMPHS PCT % 7*   MONOS PCT % 18*   EOS PCT % 0       Results from last 7 days  Lab Units 02/02/18  1454   SODIUM mmol/L 139   POTASSIUM mmol/L 3 7   CHLORIDE mmol/L 103   CO2 mmol/L 24   BUN mg/dL 8   CREATININE mg/dL 1 08   CALCIUM mg/dL 8 9   TOTAL PROTEIN g/dL 8 1   BILIRUBIN TOTAL mg/dL 1 85*   ALK PHOS U/L 110   ALT U/L 130*   AST U/L 207*   GLUCOSE RANDOM mg/dL 112           Imaging: I have personally reviewed pertinent reports  No orders to display       EKG, Pathology, and Other Studies Reviewed on Admission:   · EKG:  Sinus tachycardia    Allscripts / Epic Records Reviewed: No     ** Please Note: This note has been constructed using a voice recognition system   **

## 2018-02-03 PROBLEM — E87.6 HYPOKALEMIA: Status: ACTIVE | Noted: 2018-02-03

## 2018-02-03 PROBLEM — R17 SERUM TOTAL BILIRUBIN ELEVATED: Status: ACTIVE | Noted: 2018-02-03

## 2018-02-03 PROBLEM — R74.8 ELEVATED LIVER ENZYMES: Status: ACTIVE | Noted: 2018-02-03

## 2018-02-03 PROBLEM — F32.9 MAJOR DEPRESSIVE DISORDER: Status: ACTIVE | Noted: 2018-02-03

## 2018-02-03 LAB
ANION GAP SERPL CALCULATED.3IONS-SCNC: 9 MMOL/L (ref 4–13)
ATRIAL RATE: 104 BPM
BUN SERPL-MCNC: 6 MG/DL (ref 5–25)
CALCIUM SERPL-MCNC: 7.9 MG/DL (ref 8.3–10.1)
CHLORIDE SERPL-SCNC: 106 MMOL/L (ref 100–108)
CO2 SERPL-SCNC: 25 MMOL/L (ref 21–32)
CREAT SERPL-MCNC: 0.7 MG/DL (ref 0.6–1.3)
GFR SERPL CREATININE-BSD FRML MDRD: 103 ML/MIN/1.73SQ M
GLUCOSE SERPL-MCNC: 85 MG/DL (ref 65–140)
MAGNESIUM SERPL-MCNC: 1.8 MG/DL (ref 1.6–2.6)
P AXIS: 84 DEGREES
PHOSPHATE SERPL-MCNC: 2.5 MG/DL (ref 2.3–4.1)
POTASSIUM SERPL-SCNC: 3.1 MMOL/L (ref 3.5–5.3)
PR INTERVAL: 184 MS
QRS AXIS: 79 DEGREES
QRSD INTERVAL: 82 MS
QT INTERVAL: 356 MS
QTC INTERVAL: 468 MS
SODIUM SERPL-SCNC: 140 MMOL/L (ref 136–145)
T WAVE AXIS: 59 DEGREES
VENTRICULAR RATE: 104 BPM

## 2018-02-03 PROCEDURE — 93010 ELECTROCARDIOGRAM REPORT: CPT | Performed by: INTERNAL MEDICINE

## 2018-02-03 PROCEDURE — 83735 ASSAY OF MAGNESIUM: CPT | Performed by: INTERNAL MEDICINE

## 2018-02-03 PROCEDURE — 80048 BASIC METABOLIC PNL TOTAL CA: CPT | Performed by: INTERNAL MEDICINE

## 2018-02-03 PROCEDURE — 99225 PR SBSQ OBSERVATION CARE/DAY 25 MINUTES: CPT | Performed by: INTERNAL MEDICINE

## 2018-02-03 PROCEDURE — 84100 ASSAY OF PHOSPHORUS: CPT | Performed by: INTERNAL MEDICINE

## 2018-02-03 PROCEDURE — 99254 IP/OBS CNSLTJ NEW/EST MOD 60: CPT | Performed by: PSYCHIATRY & NEUROLOGY

## 2018-02-03 RX ORDER — LORAZEPAM 2 MG/ML
0.5 INJECTION INTRAMUSCULAR EVERY 6 HOURS PRN
Status: DISCONTINUED | OUTPATIENT
Start: 2018-02-03 | End: 2018-02-03

## 2018-02-03 RX ORDER — LORAZEPAM 2 MG/ML
1 INJECTION INTRAMUSCULAR EVERY 2 HOUR PRN
Status: DISCONTINUED | OUTPATIENT
Start: 2018-02-03 | End: 2018-02-04

## 2018-02-03 RX ORDER — DIAZEPAM 5 MG/ML
5 INJECTION, SOLUTION INTRAMUSCULAR; INTRAVENOUS ONCE
Status: COMPLETED | OUTPATIENT
Start: 2018-02-03 | End: 2018-02-03

## 2018-02-03 RX ORDER — LORAZEPAM 2 MG/ML
2 INJECTION INTRAMUSCULAR ONCE
Status: COMPLETED | OUTPATIENT
Start: 2018-02-03 | End: 2018-02-03

## 2018-02-03 RX ORDER — CHLORDIAZEPOXIDE HYDROCHLORIDE 25 MG/1
25 CAPSULE, GELATIN COATED ORAL EVERY 6 HOURS PRN
Status: DISCONTINUED | OUTPATIENT
Start: 2018-02-03 | End: 2018-02-03

## 2018-02-03 RX ORDER — LORAZEPAM 2 MG/ML
0.5 INJECTION INTRAMUSCULAR ONCE
Status: COMPLETED | OUTPATIENT
Start: 2018-02-03 | End: 2018-02-03

## 2018-02-03 RX ORDER — LORAZEPAM 2 MG/ML
INJECTION INTRAMUSCULAR
Status: DISPENSED
Start: 2018-02-03 | End: 2018-02-04

## 2018-02-03 RX ORDER — POTASSIUM CHLORIDE 20 MEQ/1
40 TABLET, EXTENDED RELEASE ORAL ONCE
Status: COMPLETED | OUTPATIENT
Start: 2018-02-03 | End: 2018-02-03

## 2018-02-03 RX ORDER — CHLORDIAZEPOXIDE HYDROCHLORIDE 25 MG/1
25 CAPSULE, GELATIN COATED ORAL EVERY 6 HOURS SCHEDULED
Status: DISCONTINUED | OUTPATIENT
Start: 2018-02-03 | End: 2018-02-03

## 2018-02-03 RX ORDER — LORAZEPAM 2 MG/ML
1 INJECTION INTRAMUSCULAR EVERY 4 HOURS PRN
Status: DISCONTINUED | OUTPATIENT
Start: 2018-02-03 | End: 2018-02-03

## 2018-02-03 RX ORDER — THIAMINE MONONITRATE (VIT B1) 100 MG
100 TABLET ORAL DAILY
Status: DISCONTINUED | OUTPATIENT
Start: 2018-02-03 | End: 2018-02-10 | Stop reason: HOSPADM

## 2018-02-03 RX ORDER — CHLORDIAZEPOXIDE HYDROCHLORIDE 25 MG/1
25 CAPSULE, GELATIN COATED ORAL EVERY 8 HOURS SCHEDULED
Status: DISCONTINUED | OUTPATIENT
Start: 2018-02-03 | End: 2018-02-03

## 2018-02-03 RX ORDER — CHLORDIAZEPOXIDE HYDROCHLORIDE 25 MG/1
25 CAPSULE, GELATIN COATED ORAL EVERY 6 HOURS SCHEDULED
Status: DISCONTINUED | OUTPATIENT
Start: 2018-02-03 | End: 2018-02-07

## 2018-02-03 RX ADMIN — DEXTROSE AND SODIUM CHLORIDE 125 ML/HR: 5; .9 INJECTION, SOLUTION INTRAVENOUS at 20:12

## 2018-02-03 RX ADMIN — LORAZEPAM 0.5 MG: 2 INJECTION INTRAMUSCULAR; INTRAVENOUS at 16:05

## 2018-02-03 RX ADMIN — LORAZEPAM 1 MG: 2 INJECTION INTRAMUSCULAR; INTRAVENOUS at 21:03

## 2018-02-03 RX ADMIN — THIAMINE HYDROCHLORIDE 100 MG: 100 INJECTION, SOLUTION INTRAMUSCULAR; INTRAVENOUS at 11:55

## 2018-02-03 RX ADMIN — LORAZEPAM 1 MG: 2 INJECTION INTRAMUSCULAR; INTRAVENOUS at 23:01

## 2018-02-03 RX ADMIN — CHLORDIAZEPOXIDE HYDROCHLORIDE 25 MG: 25 CAPSULE ORAL at 06:09

## 2018-02-03 RX ADMIN — LORAZEPAM 0.5 MG: 2 INJECTION INTRAMUSCULAR; INTRAVENOUS at 14:35

## 2018-02-03 RX ADMIN — DIAZEPAM 5 MG: 5 INJECTION, SOLUTION INTRAMUSCULAR; INTRAVENOUS at 20:08

## 2018-02-03 RX ADMIN — SENNOSIDES A AND B 8.8 MG: 415.36 LIQUID ORAL at 09:16

## 2018-02-03 RX ADMIN — DEXTROSE AND SODIUM CHLORIDE 125 ML/HR: 5; .9 INJECTION, SOLUTION INTRAVENOUS at 09:12

## 2018-02-03 RX ADMIN — FOLIC ACID 1 MG: 1 TABLET ORAL at 09:15

## 2018-02-03 RX ADMIN — CHLORDIAZEPOXIDE HYDROCHLORIDE 25 MG: 25 CAPSULE ORAL at 13:28

## 2018-02-03 RX ADMIN — POTASSIUM CHLORIDE 40 MEQ: 1500 TABLET, EXTENDED RELEASE ORAL at 09:15

## 2018-02-03 RX ADMIN — LORAZEPAM 2 MG: 2 INJECTION INTRAMUSCULAR; INTRAVENOUS at 17:50

## 2018-02-03 NOTE — CONSULTS
Psychiatric Evaluation - Behavioral Health       Assessment/Plan  Principal Problem:  f05 DELIRIUM NOT OTHERWISE SPECIFIED   F10 20 alcohol use disorder severe  F 12 20 cannabis use disorder severe  PLAN:   1) patient would benefit from inpatient rehab however he is refusing to go for inpatient rehab he would also benefit from intensive outpatient treatment he said he is willing to look into that  2) patient is currently not suicidal he said he has never suicidal is the one one observation is for suicidality that may be discontinued however patient is in and out of confused  Possibly for alcohol withdrawal and one-to-one observation may be continued for safety  3) continue patient's treatment for alcohol withdrawal      4) Communicated with patients' medical team       Chief Complaint: "I am not going to hurt myself "    History of Present Illness: This 63 a  male who was admitted after he was brought to the ER his mom called the  he made a comment to one of his friends that he wanted to blow saw head off his mother took all the guns out of the house patient told this writer that he was recommended by his boss at the job to get rehab  He said that he only drinks 1-2 beer a day however family reported that he was drinking a soda bottle of [employer] [place] a day  Patient said that he is been working at his current job for 30 years and has had no problems he said he is not depressed or suicidal and never was he has never hurt anybody else however ever since he has been the hospital he has started having withdrawal symptoms he is confused from time to time he has severe shakes    Patient seems to be minimizing his current symptoms he seemed to have a hard time staying concentrated on the interview understanding this writer however he is alert and oriented he is denying hallucinations or delusions to this writer staff seems to be concerned about his orientation and his change of mental status from time to time  Patient reported that he also has been smoking marijuana he only smokes one to 2 times a week again this writer feels that patient is minimizing his cannabis use also  PAST PSYCH HISTORY:    Denying any previous history of inpatient psychiatric hospitalizations suicide attempts or previous psychiatric treatment  Substance Abuse History:    History of heavy alcohol use and marijuana use  Family Psychiatric History:   He reports that his mother has depression    Social History:  Education:  One year of college  Learning Disabilities: None  Marital history:  Single has one daughter  Living arrangement, social support:  Patient lives alone  Occupational History:  Patient had a [employer] for last 30 years  Functioning Relationships:  Good support system  Other Pertinent History: None      Traumatic History:   Abuse: None  Other Traumatic Events: None  History reviewed  No pertinent past medical history  Medical Review Of Systems:  All 12 point review of system is normal except for what is mention in medical hisotry  Scheduled Meds:  Current Facility-Administered Medications:  acetaminophen 650 mg Oral Q6H PRN Kassie Mclean MD    chlordiazePOXIDE 25 mg Oral Q6H [facility] BRY Vernon    dextrose 5 % and sodium chloride 0 9 % 125 mL/hr Intravenous Continuous Kassie Mclean MD Last Rate: 125 mL/hr (79/62/59 2071)   folic acid 1 mg Oral Daily Kassie Mclean MD    LORazepam 0 5 mg Intravenous Q6H PRN BRY Vernon    metoprolol 5 mg Intravenous Q6H PRN Kassie Mclean MD    ondansetron 4 mg Intravenous Q6H PRN Kassie Mclean MD    senna 8 8 mg Oral Daily Kassie Mclean MD    simethicone 80 mg Oral 4x Daily PRN Kassie Mclean MD    thiamine 100 mg Intravenous Daily Kassie Mclean MD Last Rate: 100 mg (02/03/18 1155)     Continuous Infusions:  dextrose 5 % and sodium chloride 0 9 % 125 mL/hr Last Rate: 125 mL/hr (02/03/18 0912)     PRN Meds:   acetaminophen    LORazepam    metoprolol    ondansetron    simethicone    No Known Allergies     Vitals:    02/03/18 1353   BP: 127/87   Pulse: 96   Resp:    Temp:    SpO2: 96%         Mental Status Evaluation:  Appearance:  Appeared disheveled wearing hospital scrubs   Behavior:  Cooperative guarded   Speech:  Speech was no mostly goal-directed time he derailed from the topic  Mood:  Reported mood is good   Affect Appeared actions   Language: naming objects and Goal directed  Thought Process:  Time he had a hard time concentrating and thought process was not related to the topic of discussion   Thought Content:  Normal   Perceptual Disturbances:  denying any auditory and visual hallucinations  Risk Potential: Denied any suicidal homicidal   Sensorium:  person, place, time/date, situation, day of week, month of year and year   Cognition:  grossly intact   Consciousness:   alert, awake, and oriented ×3    Attention: Had a hard time concentrating   Intellect: Normal   Fund of Knowledge: awareness of current events: normal    Insight:  Poor and   Judgment: Poor judgment   Muscle Strength and Tone: Normal    Gait/Station:  gait was not assessed    Motor Activity: This is a boost shakes however he told this writer that he does not have shakes  Lab Results: I have personally reviewed pertinent lab results  NOTE:  Total of 40 minutes were spent in talking to patient completing this medical record reviewing medical chart medical decision making    Lucy Frias MD

## 2018-02-03 NOTE — PROGRESS NOTES
Called to bedside by RN d/t increased confusion and tremors  Pt is a&ox3, +asterixis worse now than earlier  Pt denying any current complaints at this time  Will increase librium to 25mg q6h, add PRN ativan 0 5mg IV q6h PRN, add telemetry  C/w IVFs for re-hydration   Will admit to inpatient status since the patient is not safe for discharge and needs continuous monitoring, and treatment for alcohol withdrawal

## 2018-02-03 NOTE — PROGRESS NOTES
Pt was more confused than this morning and had more tremors he is a&O x3 but was having increased episodes of confusion jose brito with terence and she came and evaluated the patient and I was told to give PRN ativan and if there is further agitation/confusion to page and will give another PRN medication will continue to monitor

## 2018-02-03 NOTE — ASSESSMENT & PLAN NOTE
· The consult pending  · Currently denying suicidal ideation  · Continue with one-to-one until cleared by psych

## 2018-02-03 NOTE — ASSESSMENT & PLAN NOTE
· C/w Librium 25mg attempt to wean to q8h with p r n if neeed  · IV beta-blocker prn  · C/w IV fluids  · thiamine and folate  · psychiatry evaluation for alcohol withdrawal counseling, pt interested in rehab and also patient with evidence of depression and suicidal ideation last week    Currently denies any suicidal ideations at this time  · UDS (-), HERO (-)

## 2018-02-03 NOTE — PROGRESS NOTES
Progress Note - Efren Limon 1955, 63 y o  male MRN: [id number]    Unit/Bed#: CW2 215-02 Encounter: [id number]    Primary Care Provider: Codi Pritchard DO   Date and time admitted to hospital: 2/2/2018  1:39 PM        * Alcohol withdrawal syndrome without complication ([facility] [address] )   Assessment & Plan    · C/w Librium 25mg attempt to wean to q8h with p r n if neeed  · IV beta-blocker prn  · C/w IV fluids  · thiamine and folate  · psychiatry evaluation for alcohol withdrawal counseling, pt interested in rehab and also patient with evidence of depression and suicidal ideation last week  Currently denies any suicidal ideations at this time  · UDS (-), HERO (-)        Major depressive disorder   Assessment & Plan    · The consult pending  · Currently denying suicidal ideation  · Continue with one-to-one until cleared by psych        Elevated liver enzymes   Assessment & Plan    · Elevated t bili 1 85; ; - likely secondary to alcohol abuse   · F/u outpatient after discharge         Hypokalemia   Assessment & Plan    · Replete with 40meq kdur            VTE Pharmacologic Prophylaxis:   Pharmacologic: low risk  Mechanical VTE Prophylaxis in Place: No    Patient Centered Rounds: I have performed bedside rounds with nursing staff today  Discussions with Specialists or Other Care Team Provider: rn     Education and Discussions with Family / Patient: d/w patient  Declined call to family     Time Spent for Care: 30 minutes  More than 50% of total time spent on counseling and coordination of care as described above  Current Length of Stay: 0 day(s)    Current Patient Status: Observation   Certification Statement: the pt is admitted under observation  pending psych eval and rehab placmeent  Discharge Plan: pending psych eval     Code Status: Level 1 - Full Code      Subjective:   Pt reports that he is feeling better  Denies any withdrawal symptoms  Patient denies any suicidal ideations    Patient reports his could obtain utilized breakfast   Patient expresses interest in alcohol rehab so he can return to work after discharge  Denies any other complaints or concerns at this time    Objective:     Vitals:   Temp (24hrs), Av 5 °F (37 5 °C), Min:98 7 °F (37 1 °C), Max:100 1 °F (37 8 °C)    HR:  [] 86  Resp:  [18-20] 18  BP: (138-186)/() 142/84  SpO2:  [93 %-98 %] 95 %  Body mass index is 23 75 kg/m²  Input and Output Summary (last 24 hours): Intake/Output Summary (Last 24 hours) at 18 0851  Last data filed at 18 0500   Gross per 24 hour   Intake                0 ml   Output                0 ml   Net                0 ml       Physical Exam:     Physical Exam   Constitutional: He is oriented to person, place, and time  No distress  Neck: Neck supple  Cardiovascular: Normal rate, regular rhythm, normal heart sounds and intact distal pulses  No murmur heard  Pulmonary/Chest: Effort normal and breath sounds normal  No respiratory distress  He has no wheezes  He has no rales  Abdominal: Soft  Bowel sounds are normal  He exhibits no distension  There is no tenderness  There is no rebound  Musculoskeletal: He exhibits no edema or tenderness  Neurological: He is alert and oriented to person, place, and time  He displays tremor (mild asterixis noted in b/l hands )  Skin: Skin is warm and dry  No rash noted  He is not diaphoretic  No erythema  Psychiatric: He has a normal mood and affect  He expresses no suicidal ideation       Additional Data:     Labs:      Results from last 7 days  Lab Units 18  1454   WBC Thousand/uL 6 70   HEMOGLOBIN g/dL 15 7   HEMATOCRIT % 43 5   PLATELETS Thousands/uL 160   NEUTROS PCT % 74   LYMPHS PCT % 7*   MONOS PCT % 18*   EOS PCT % 0       Results from last 7 days  Lab Units 18  0509 18  1454   SODIUM mmol/L 140 139   POTASSIUM mmol/L 3 1* 3 7   CHLORIDE mmol/L 106 103   CO2 mmol/L 25 24   BUN mg/dL 6 8   CREATININE mg/dL 0 70 1 08   CALCIUM mg/dL 7 9* 8 9   TOTAL PROTEIN g/dL  --  8 1   BILIRUBIN TOTAL mg/dL  --  1 85*   ALK PHOS U/L  --  110   ALT U/L  --  130*   AST U/L  --  207*   GLUCOSE RANDOM mg/dL 85 112           * I Have Reviewed All Lab Data Listed Above  * Additional Pertinent Lab Tests Reviewed: All Labs Within Last 24 Hours Reviewed        Recent Cultures (last 7 days):           Last 24 Hours Medication List:     Current Facility-Administered Medications:  acetaminophen 650 mg Oral Q6H PRN Daya Lopez MD    chlordiazePOXIDE 25 mg Oral Q6H [address] Erick Sutherland MD    chlordiazePOXIDE 25 mg Oral Q6H PRN Lisa Sutherland MD    dextrose 5 % and sodium chloride 0 9 % 125 mL/hr Intravenous Continuous Daya Lopez MD Last Rate: 125 mL/hr (58/51/10 1475)   folic acid 1 mg Oral Daily Erick Sutherland MD    metoprolol 5 mg Intravenous Q6H PRN Lisa Sutherland MD    ondansetron 4 mg Intravenous Q6H PRN Daya Lopez MD    senna 8 8 mg Oral Daily Daya Lopez MD    simethicone 80 mg Oral 4x Daily PRN Daya Lopez MD    thiamine 100 mg Intravenous Daily Daya Lopez MD         Today, Patient Was Seen By: BRY Chauhan    ** Please Note: Dictation voice to text software may have been used in the creation of this document   **

## 2018-02-03 NOTE — CASE MANAGEMENT
Initial Clinical Review    Thank you,  [address] [facility] in the [facility] by Tim Shukla for 2017  Network Utilization Review Department  Phone: [phone]; Fax [phone]  ATTENTION: The Network Utilization Review Department is now centralized for our 7 Facilities  Make a note that we have a new phone and fax numbers for our Department  Please call with any questions or concerns to [phone] and carefully follow the prompts so that you are directed to the right person  All voicemails are confidential  Fax any determinations, approvals, denials, and requests for initial or continue stay review clinical to [phone]  Due to HIGH CALL volume, it would be easier if you could please send faxed requests to expedite your requests and in part, help us provide discharge notifications faster  Admission: Date/Time/Statement: 02/02/18 @ 1616 -- OBS    Orders Placed This Encounter   Procedures    Place in Observation (expected length of stay for this patient is less than two midnights)     Standing Status:   Standing     Number of Occurrences:   1     Order Specific Question:   Admitting Physician     Answer:   Shannon Hsu [[id number]]     Order Specific Question:   Level of Care     Answer:   Med Surg [16]         ED: Date/Time/Mode of Arrival:   ED Arrival Information     Expected Arrival Acuity Means of Arrival Escorted By Service Admission Type    2/2/2018 13:12 2/2/2018 13:39 Emergent Ambulance [address] [facility] Emergency    Arrival Complaint    Suicidal          Chief Complaint:   Chief Complaint   Patient presents with    Psychiatric Evaluation     Pts family states that pt made a comment about blowing his head off to his best friend on Monday   were called and pt states that he made the comment out of anger  Per mother all firearms were taking out of the house however pt told EMS that there is still one in the house   Pt has been a heavy drinker over the last few weeks and denies detoxing at this time but is very shaky and anxious  Mother is concerned about liver enzymes due to drinking  History of Illness:  63 y o  male who presents with alcohol withdrawal   Patient is at work and was randomly drug tested with positive urine tox for marijuana  He was sent to [facility] was noted to be tremulous and diaphoretic  Patient admits to drinking at least 4 beers a day patient's mother reports he also drinks hard liquor  Patient has had no alcohol for at least 36 hours  He denies feeling tremulous but was given Valium in the ER for tremors and is feeling better at this time  Patient also reportedly expressed suicidal ideation about a week ago however he is denying any suicidal homicidal ideation at this time    Patient's CIWA score 9-10, given 5mg valium IV and saw a large improvement in his symptoms   Patient given folate and thiamine supplements po and his phosphorus was repleted with [facility] IVPB       ED Vital Signs:   ED Triage Vitals [02/02/18 1357]   Temperature Pulse Respirations Blood Pressure SpO2   98 7 °F (37 1 °C) (!) 114 20 (!) 162/101 98 %      Temp Source Heart Rate Source Patient Position - Orthostatic VS BP Location FiO2 (%)   Oral Monitor Lying Left arm --      Pain Score       No Pain        Wt Readings from Last 1 Encounters:   02/02/18 81 6 kg (180 lb)       Vital Signs:  02/02 0701  02/03 0700 02/03 0701  02/03 0949  Most Recent     Temperature (°F) 98 7100 1 99 4  99 4 (37 4)    Pulse 68120 86  86    Respirations 1820 18  18    Blood Pressure 138/89186/99 142/84  142/84    SpO2 (%) 9398 95  95        Abnormal Labs/Diagnostic Test Results:   Lab Units 02/02/18  1454   WBC Thousand/uL 6 70   HEMOGLOBIN g/dL 15 7   HEMATOCRIT % 43 5   PLATELETS Thousands/uL 160   NEUTROS PCT % 74   LYMPHS PCT % 7*   MONOS PCT % 18*   EOS PCT % 0      Lab Units 02/02/18  1454   SODIUM mmol/L 139   POTASSIUM mmol/L 3 7 CHLORIDE mmol/L 103   CO2 mmol/L 24   BUN mg/dL 8   CREATININE mg/dL 1 08   CALCIUM mg/dL 8 9   TOTAL PROTEIN g/dL 8 1   BILIRUBIN TOTAL mg/dL 1 85*   ALK PHOS U/L 110   ALT U/L 130*   AST U/L 207*   GLUCOSE RANDOM mg/dL 112     EKG:  Sinus tachycardia       ED Treatment:   Medication Administration from 02/02/2018 1312 to 02/02/2018 1957       Date/Time Order Dose Route Action Action by Comments     02/02/2018 1459 sodium chloride 0 9 % bolus 1,000 mL 1,000 mL Intravenous New Bag Jaunanjelica Ashley, [facility]      02/02/2018 1459 thiamine (VITAMIN B1) tablet 100 mg 100 mg Oral Given [facility], RN      72/30/8947 8117 folic acid (FOLVITE) tablet 400 mcg 400 mcg Oral Given [facility], RN      02/02/2018 1459 diazepam (VALIUM) injection 5 mg 5 mg Intravenous Given Ludmila Ramirez, RN      02/02/2018 1810 potassium phosphate 30 mmol in sodium chloride 0 9 % 250 mL infusion 30 mmol Intravenous Sal Velasco RN           Past Medical/Surgical History:   No Additional Past Medical History       Admitting Diagnosis: Alcohol withdrawal ([place] [id number] ) [F10 239]  Suicidal behavior [R46 89]  Depression [F32 9]    Age/Sex: 63 y o  male    Assessment/Plan:   * Alcohol withdrawal syndrome without complication ([place] [address] )   Assessment & Plan     · Will start him on a clock Librium with p r n   · IV beta-blocker as needed  · IV fluids  · thiamine and folate  · Will get psychiatry evaluation for alcohol withdrawal counseling also patient with evidence of depression and suicidal ideation last week    Currently denies any suicidal ideations to me         Admission Orders:  M/S/Tele unit  Telem  1:1 observation  Consult psych  SCD's  Ambulate q shift  Regular diet      Scheduled Meds:   Current Facility-Administered Medications:  acetaminophen 650 mg Oral Q6H PRN Demetris Caballero MD    chlordiazePOXIDE 25 mg Oral Q6H PRN Demetris Caballero MD    chlordiazePOXIDE 25 mg Oral Q8H [address] BRY Vernon    dextrose 5 % and sodium chloride 0 9 % 125 mL/hr Intravenous Continuous Dede Zuñiga MD Last Rate: 125 mL/hr (45/52/56 3127)   folic acid 1 mg Oral Daily Dede Zuñiga MD    metoprolol 5 mg Intravenous Q6H PRN Dede Zuñiga MD    ondansetron 4 mg Intravenous Q6H PRN Dede Zuñiga MD    senna 8 8 mg Oral Daily Dede Zuñiga MD    simethicone 80 mg Oral 4x Daily PRN Dede Zuñiga MD    thiamine 100 mg Intravenous Daily Dede Zuñiga MD      Continuous Infusions:   dextrose 5 % and sodium chloride 0 9 % 125 mL/hr Last Rate: 125 mL/hr (02/03/18 0912)     PRN Meds:   Acetaminophen 650 mg po x1    chlordiazePOXIDE    Metoprolol 5 mg IV x1    ondansetron    simethicone

## 2018-02-04 ENCOUNTER — APPOINTMENT (INPATIENT)
Dept: RADIOLOGY | Facility: HOSPITAL | Age: 63
DRG: 896 | End: 2018-02-04
Payer: COMMERCIAL

## 2018-02-04 LAB
ALBUMIN SERPL BCP-MCNC: 3 G/DL (ref 3.5–5)
ALP SERPL-CCNC: 84 U/L (ref 46–116)
ALT SERPL W P-5'-P-CCNC: 79 U/L (ref 12–78)
ANION GAP SERPL CALCULATED.3IONS-SCNC: 7 MMOL/L (ref 4–13)
ANION GAP SERPL CALCULATED.3IONS-SCNC: 7 MMOL/L (ref 4–13)
AST SERPL W P-5'-P-CCNC: 104 U/L (ref 5–45)
BACTERIA UR QL AUTO: ABNORMAL /HPF
BASOPHILS # BLD AUTO: 0.03 THOUSANDS/ΜL (ref 0–0.1)
BASOPHILS NFR BLD AUTO: 0 % (ref 0–1)
BILIRUB SERPL-MCNC: 1.18 MG/DL (ref 0.2–1)
BILIRUB UR QL STRIP: NEGATIVE
BUN SERPL-MCNC: 10 MG/DL (ref 5–25)
BUN SERPL-MCNC: 7 MG/DL (ref 5–25)
CALCIUM SERPL-MCNC: 8.2 MG/DL (ref 8.3–10.1)
CALCIUM SERPL-MCNC: 8.3 MG/DL (ref 8.3–10.1)
CHLORIDE SERPL-SCNC: 103 MMOL/L (ref 100–108)
CHLORIDE SERPL-SCNC: 104 MMOL/L (ref 100–108)
CLARITY UR: CLEAR
CO2 SERPL-SCNC: 25 MMOL/L (ref 21–32)
CO2 SERPL-SCNC: 27 MMOL/L (ref 21–32)
COLOR UR: YELLOW
CREAT SERPL-MCNC: 0.73 MG/DL (ref 0.6–1.3)
CREAT SERPL-MCNC: 0.8 MG/DL (ref 0.6–1.3)
EOSINOPHIL # BLD AUTO: 0.02 THOUSAND/ΜL (ref 0–0.61)
EOSINOPHIL NFR BLD AUTO: 0 % (ref 0–6)
ERYTHROCYTE [DISTWIDTH] IN BLOOD BY AUTOMATED COUNT: 13.6 % (ref 11.6–15.1)
GFR SERPL CREATININE-BSD FRML MDRD: 101 ML/MIN/1.73SQ M
GFR SERPL CREATININE-BSD FRML MDRD: 97 ML/MIN/1.73SQ M
GLUCOSE SERPL-MCNC: 79 MG/DL (ref 65–140)
GLUCOSE SERPL-MCNC: 88 MG/DL (ref 65–140)
GLUCOSE UR STRIP-MCNC: NEGATIVE MG/DL
HCT VFR BLD AUTO: 47.6 % (ref 36.5–49.3)
HGB BLD-MCNC: 17.1 G/DL (ref 12–17)
HGB UR QL STRIP.AUTO: ABNORMAL
HYALINE CASTS #/AREA URNS LPF: ABNORMAL /LPF
KETONES UR STRIP-MCNC: ABNORMAL MG/DL
LEUKOCYTE ESTERASE UR QL STRIP: NEGATIVE
LYMPHOCYTES # BLD AUTO: 0.86 THOUSANDS/ΜL (ref 0.6–4.47)
LYMPHOCYTES NFR BLD AUTO: 12 % (ref 14–44)
MAGNESIUM SERPL-MCNC: 1.8 MG/DL (ref 1.6–2.6)
MCH RBC QN AUTO: 34.5 PG (ref 26.8–34.3)
MCHC RBC AUTO-ENTMCNC: 35.9 G/DL (ref 31.4–37.4)
MCV RBC AUTO: 96 FL (ref 82–98)
MONOCYTES # BLD AUTO: 0.98 THOUSAND/ΜL (ref 0.17–1.22)
MONOCYTES NFR BLD AUTO: 14 % (ref 4–12)
NEUTROPHILS # BLD AUTO: 5.18 THOUSANDS/ΜL (ref 1.85–7.62)
NEUTS SEG NFR BLD AUTO: 74 % (ref 43–75)
NITRITE UR QL STRIP: NEGATIVE
NON-SQ EPI CELLS URNS QL MICRO: ABNORMAL /HPF
NRBC BLD AUTO-RTO: 0 /100 WBCS
PH UR STRIP.AUTO: 6 [PH] (ref 4.5–8)
PLATELET # BLD AUTO: 168 THOUSANDS/UL (ref 149–390)
PMV BLD AUTO: 10.5 FL (ref 8.9–12.7)
POTASSIUM SERPL-SCNC: 3.3 MMOL/L (ref 3.5–5.3)
POTASSIUM SERPL-SCNC: 3.6 MMOL/L (ref 3.5–5.3)
PROT SERPL-MCNC: 7.3 G/DL (ref 6.4–8.2)
PROT UR STRIP-MCNC: ABNORMAL MG/DL
RBC # BLD AUTO: 4.95 MILLION/UL (ref 3.88–5.62)
RBC #/AREA URNS AUTO: ABNORMAL /HPF
SODIUM SERPL-SCNC: 136 MMOL/L (ref 136–145)
SODIUM SERPL-SCNC: 137 MMOL/L (ref 136–145)
SP GR UR STRIP.AUTO: 1.02 (ref 1–1.03)
UROBILINOGEN UR QL STRIP.AUTO: 1 E.U./DL
WBC # BLD AUTO: 7.09 THOUSAND/UL (ref 4.31–10.16)
WBC #/AREA URNS AUTO: ABNORMAL /HPF

## 2018-02-04 PROCEDURE — 80048 BASIC METABOLIC PNL TOTAL CA: CPT | Performed by: NURSE PRACTITIONER

## 2018-02-04 PROCEDURE — 80053 COMPREHEN METABOLIC PANEL: CPT | Performed by: INTERNAL MEDICINE

## 2018-02-04 PROCEDURE — 87040 BLOOD CULTURE FOR BACTERIA: CPT | Performed by: INTERNAL MEDICINE

## 2018-02-04 PROCEDURE — 81001 URINALYSIS AUTO W/SCOPE: CPT | Performed by: INTERNAL MEDICINE

## 2018-02-04 PROCEDURE — 99232 SBSQ HOSP IP/OBS MODERATE 35: CPT | Performed by: INTERNAL MEDICINE

## 2018-02-04 PROCEDURE — 85025 COMPLETE CBC W/AUTO DIFF WBC: CPT | Performed by: INTERNAL MEDICINE

## 2018-02-04 PROCEDURE — 87798 DETECT AGENT NOS DNA AMP: CPT | Performed by: INTERNAL MEDICINE

## 2018-02-04 PROCEDURE — 83735 ASSAY OF MAGNESIUM: CPT | Performed by: NURSE PRACTITIONER

## 2018-02-04 PROCEDURE — 71046 X-RAY EXAM CHEST 2 VIEWS: CPT

## 2018-02-04 RX ORDER — LORAZEPAM 2 MG/ML
1 INJECTION INTRAMUSCULAR
Status: DISCONTINUED | OUTPATIENT
Start: 2018-02-04 | End: 2018-02-04

## 2018-02-04 RX ORDER — DIPHENHYDRAMINE HYDROCHLORIDE 50 MG/ML
25 INJECTION INTRAMUSCULAR; INTRAVENOUS EVERY 6 HOURS PRN
Status: DISCONTINUED | OUTPATIENT
Start: 2018-02-04 | End: 2018-02-10 | Stop reason: HOSPADM

## 2018-02-04 RX ORDER — POTASSIUM CHLORIDE 20 MEQ/1
40 TABLET, EXTENDED RELEASE ORAL ONCE
Status: DISCONTINUED | OUTPATIENT
Start: 2018-02-04 | End: 2018-02-05

## 2018-02-04 RX ORDER — LORAZEPAM 2 MG/ML
1 INJECTION INTRAMUSCULAR
Status: DISCONTINUED | OUTPATIENT
Start: 2018-02-04 | End: 2018-02-05

## 2018-02-04 RX ORDER — LORAZEPAM 2 MG/ML
1 INJECTION INTRAMUSCULAR EVERY 6 HOURS PRN
Status: DISCONTINUED | OUTPATIENT
Start: 2018-02-04 | End: 2018-02-05

## 2018-02-04 RX ADMIN — ACETAMINOPHEN 650 MG: 325 TABLET, FILM COATED ORAL at 16:45

## 2018-02-04 RX ADMIN — LORAZEPAM 1 MG: 2 INJECTION INTRAMUSCULAR; INTRAVENOUS at 09:49

## 2018-02-04 RX ADMIN — LORAZEPAM 1 MG: 2 INJECTION INTRAMUSCULAR; INTRAVENOUS at 05:38

## 2018-02-04 RX ADMIN — LORAZEPAM 1 MG: 2 INJECTION INTRAMUSCULAR; INTRAVENOUS at 01:03

## 2018-02-04 RX ADMIN — CEFEPIME HYDROCHLORIDE 1000 MG: 1 INJECTION, SOLUTION INTRAVENOUS at 20:15

## 2018-02-04 RX ADMIN — LORAZEPAM 1 MG: 2 INJECTION INTRAMUSCULAR; INTRAVENOUS at 13:19

## 2018-02-04 RX ADMIN — LORAZEPAM 1 MG: 2 INJECTION INTRAMUSCULAR; INTRAVENOUS at 02:45

## 2018-02-04 RX ADMIN — LORAZEPAM 1 MG: 2 INJECTION INTRAMUSCULAR; INTRAVENOUS at 03:57

## 2018-02-04 RX ADMIN — LORAZEPAM 1 MG: 2 INJECTION INTRAMUSCULAR; INTRAVENOUS at 15:35

## 2018-02-04 RX ADMIN — LORAZEPAM 1 MG: 2 INJECTION INTRAMUSCULAR; INTRAVENOUS at 14:25

## 2018-02-04 RX ADMIN — DEXTROSE AND SODIUM CHLORIDE 125 ML/HR: 5; .9 INJECTION, SOLUTION INTRAVENOUS at 07:49

## 2018-02-04 RX ADMIN — DEXTROSE AND SODIUM CHLORIDE 125 ML/HR: 5; .9 INJECTION, SOLUTION INTRAVENOUS at 15:40

## 2018-02-04 RX ADMIN — LORAZEPAM 1 MG: 2 INJECTION INTRAMUSCULAR; INTRAVENOUS at 23:59

## 2018-02-04 RX ADMIN — DIPHENHYDRAMINE HYDROCHLORIDE 25 MG: 50 INJECTION, SOLUTION INTRAMUSCULAR; INTRAVENOUS at 01:03

## 2018-02-04 RX ADMIN — LORAZEPAM 1 MG: 2 INJECTION INTRAMUSCULAR; INTRAVENOUS at 20:40

## 2018-02-04 NOTE — PLAN OF CARE
BEHAVIOR     Pt/Family maintain appropriate behavior and adhere to behavioral management agreement, if implemented Progressing        COPING     Will report anxiety at manageable levels Progressing        Depression - IP adult     Effects of depression will be minimized Progressing        Knowledge Deficit     Patient/family/caregiver demonstrates understanding of disease process, treatment plan, medications, and discharge instructions Progressing        METABOLIC, FLUID AND ELECTROLYTES - ADULT     Electrolytes maintained within normal limits Progressing     Fluid balance maintained Progressing        NEUROSENSORY - ADULT     Achieves stable or improved neurological status Progressing     Achieves maximal functionality and self care Progressing        Potential for Falls     Patient will remain free of falls Progressing        SAFETY ADULT     Patient will remain free of falls Progressing     Maintain or return to baseline ADL function Progressing     Maintain or return mobility status to optimal level Progressing        SELF HARM     Effect of psychiatric condition will be minimized and patient will be protected from self harm Progressing

## 2018-02-04 NOTE — ASSESSMENT & PLAN NOTE
· Elevated t bili 1 85; ;  - likely secondary to alcohol abuse   · Monitor daily  · F/u outpatient after discharge

## 2018-02-04 NOTE — PROGRESS NOTES
Patient is very agitated and urinating on himself x3 and all over floor  He keeps trying to get up and OOB but very unsteady on his feet  He is also demanding to go into his hallway closet for his brown box  We keep reinforcing to him that he is in the hospital and not at home  Colin AMIN spoke to Chele Vasquez, [address] Kandi Guardado  Waiting on orders

## 2018-02-04 NOTE — PROGRESS NOTES
Patient and patient's family (mother, Venessa Myrick, and nephew, Ozzy Dimas) say that patient had wallet here at the hospital   Ozzy Dimas said that patient started in the ED, was transferred to room 215 and then was brought to room 834  Unable to find the wallet in patient's room, but cell phone is at bedside  Belongings checklist was not completed  Checked with security but they do not have anything for this patient  Spoke to [facility] on CW2 to see if wallet could have been left in room 215  She did not find anything but made charge nurse aware  Spoke to [facility] in ED who searched but was not able to find anything  Made P8 charge nurse, Jean Carlos Ventura, aware of the issue  She left message with storeroom regarding searching the hawk Claros's cell phone number [phone], to call if wallet is found

## 2018-02-04 NOTE — PROGRESS NOTES
Patient refusing telemetry  Dr Becerril notified and said this is alright but to periodically spot check pulse ox

## 2018-02-04 NOTE — PROGRESS NOTES
Progress Note - Emeka Garcia 1955, 63 y o  male MRN: [id number]    Unit/Bed#: [facility] 834-01 Encounter: [id number]    Primary Care Provider: Yari Carter DO   Date and time admitted to hospital: 2/2/2018  1:39 PM        Major depressive disorder   Assessment & Plan    · The consult pending  · Currently denying suicidal ideation  · Continue with one-to-one until cleared by psych        Elevated liver enzymes   Assessment & Plan    · Elevated t bili 1 85; ; - likely secondary to alcohol abuse   · Monitor daily  · F/u outpatient after discharge         Hypokalemia   Assessment & Plan    · Replete with 40meq kdur  · Monitor daily bmp and cbc        * Alcohol withdrawal syndrome without complication (HCC)   Assessment & Plan    · C/w Librium 25mg attempt to wean to q8h with p r n if neeed  · IV beta-blocker prn  · C/w IV fluids  · thiamine and folate  · Ativan q3hrly as needed, give for hypertension/agitation/tachycardia  · Monitor in and output  · Daily weights  · psychiatry evaluation for alcohol withdrawal counseling, pt interested in rehab and also patient with evidence of depression and suicidal ideation last week  Currently denies any suicidal ideations at this time  · UDS (-), BAC (-)                VTE Pharmacologic Prophylaxis:   Pharmacologic: Heparin  Mechanical VTE Prophylaxis in Place: Yes    Patient Centered Rounds: I have performed bedside rounds with nursing staff today  Discussions with Specialists or Other Care Team Provider:      Education and Discussions with Family / Patient: patient    Time Spent for Care: 45 minutes  More than 50% of total time spent on counseling and coordination of care as described above      Current Length of Stay: 1 day(s)    Current Patient Status: Inpatient   Certification Statement: The patient will continue to require additional inpatient hospital stay due to alcohol withdrawl    Discharge Plan: possible rehab    Code Status: Level 1 - Full Code      Subjective:   I am doing well doctor  Today is 2018, I am in [facility] and my name is Ayad [address]  Date of birth given  No complaints of pain, discomfort  Objective:     Vitals:   Temp (24hrs), Av °F (37 2 °C), Min:98 °F (36 7 °C), Max:100 8 °F (38 2 °C)    HR:  [] 118  Resp:  [16-20] 18  BP: (145-173)/() 173/101  SpO2:  [94 %-99 %] 94 %  Body mass index is 23 57 kg/m²  Input and Output Summary (last 24 hours): Intake/Output Summary (Last 24 hours) at 18 1619  Last data filed at 18 1539   Gross per 24 hour   Intake          5482 09 ml   Output             1753 ml   Net          3729 09 ml       Physical Exam:     Physical Exam   Constitutional: He is oriented to person, place, and time  He appears well-developed and well-nourished  No distress  HENT:   Head: Normocephalic and atraumatic  Right Ear: External ear normal    Left Ear: External ear normal    Nose: Nose normal    Mouth/Throat: Oropharynx is clear and moist  No oropharyngeal exudate  Eyes: Conjunctivae and EOM are normal  Pupils are equal, round, and reactive to light  Right eye exhibits no discharge  Left eye exhibits no discharge  No scleral icterus  Neck: Normal range of motion  Neck supple  No JVD present  No tracheal deviation present  No thyromegaly present  Cardiovascular: Normal rate, regular rhythm, normal heart sounds and intact distal pulses  Exam reveals no gallop and no friction rub  No murmur heard  Pulmonary/Chest: Effort normal and breath sounds normal  No stridor  No respiratory distress  He has no wheezes  He has no rales  He exhibits no tenderness  Abdominal: Soft  Bowel sounds are normal  He exhibits no distension and no mass  There is no tenderness  There is no rebound and no guarding  Musculoskeletal: Normal range of motion  He exhibits no edema, tenderness or deformity  Lymphadenopathy:     He has no cervical adenopathy     Neurological: He is alert and oriented to person, place, and time  He has normal reflexes  He displays normal reflexes  No cranial nerve deficit  He exhibits normal muscle tone  Coordination normal    Skin: Skin is warm and dry  No rash noted  He is not diaphoretic  No erythema  No pallor  Psychiatric: He has a normal mood and affect  His behavior is normal  Judgment and thought content normal    Nursing note and vitals reviewed  Additional Data:     Labs:      Results from last 7 days  Lab Units 02/02/18  1454   WBC Thousand/uL 6 70   HEMOGLOBIN g/dL 15 7   HEMATOCRIT % 43 5   PLATELETS Thousands/uL 160   NEUTROS PCT % 74   LYMPHS PCT % 7*   MONOS PCT % 18*   EOS PCT % 0       Results from last 7 days  Lab Units 02/04/18  0557  02/02/18  1454   SODIUM mmol/L 137  < > 139   POTASSIUM mmol/L 3 6  < > 3 7   CHLORIDE mmol/L 103  < > 103   CO2 mmol/L 27  < > 24   BUN mg/dL 7  < > 8   CREATININE mg/dL 0 73  < > 1 08   CALCIUM mg/dL 8 2*  < > 8 9   TOTAL PROTEIN g/dL  --   --  8 1   BILIRUBIN TOTAL mg/dL  --   --  1 85*   ALK PHOS U/L  --   --  110   ALT U/L  --   --  130*   AST U/L  --   --  207*   GLUCOSE RANDOM mg/dL 79  < > 112   < > = values in this interval not displayed  * I Have Reviewed All Lab Data Listed Above  * Additional Pertinent Lab Tests Reviewed:  [address] Admission Reviewed    Imaging:    Imaging Reports Reviewed Today Include:    Imaging Personally Reviewed by Myself Includes:       Recent Cultures (last 7 days):           Last 24 Hours Medication List:     Current Facility-Administered Medications:  acetaminophen 650 mg Oral Q6H PRN Shante Weber MD    chlordiazePOXIDE 25 mg Oral Q6H [address] Terrell Caballero MD    dextrose 5 % and sodium chloride 0 9 % 125 mL/hr Intravenous Continuous Shante Weber MD Last Rate: 125 mL/hr (02/04/18 1540)   diphenhydrAMINE 25 mg Intravenous Q6H PRN BRY Lopez    folic acid 1 mg Oral Daily Shante Weber MD    LORazepam 1 mg Intravenous Q1H PRN Jaron MORAN Pao Tobin, CRADAM    metoprolol 5 mg Intravenous Q6H PRN Lucina Wilson MD    ondansetron 4 mg Intravenous Q6H PRN Luicna Wilson MD    senna 8 8 mg Oral Daily Lucina Wilson MD    simethicone 80 mg Oral 4x Daily PRN Lucina Wilson MD    thiamine 100 mg Oral Daily BRY Infante         Today, Patient Was Seen By: Alessandra Lockett MD    ** Please Note: Dictation voice to text software may have been used in the creation of this document   **

## 2018-02-04 NOTE — PROGRESS NOTES
Patient very agitated and confused all night  Called [facility] spoke to Hsieh American  Going to make ativan Q2 since patient is refusing to take the Librium

## 2018-02-04 NOTE — ASSESSMENT & PLAN NOTE
· C/w Librium 25mg attempt to wean to q8h with p r n if neeed  · IV beta-blocker prn  · C/w IV fluids  · thiamine and folate  · Ativan q3hrly as needed, give for hypertension/agitation/tachycardia  · Monitor in and output  · Daily weights  · psychiatry evaluation for alcohol withdrawal counseling, pt interested in rehab and also patient with evidence of depression and suicidal ideation last week    Currently denies any suicidal ideations at this time  · UDS (-), BAC (-)

## 2018-02-05 PROBLEM — J10.1 INFLUENZA A: Status: RESOLVED | Noted: 2018-02-05 | Resolved: 2018-02-05

## 2018-02-05 PROBLEM — A41.89 VIRAL SEPSIS (HCC): Status: ACTIVE | Noted: 2018-02-05

## 2018-02-05 PROBLEM — J10.1 INFLUENZA A: Status: ACTIVE | Noted: 2018-02-05

## 2018-02-05 PROBLEM — B97.89 VIRAL SEPSIS (HCC): Status: ACTIVE | Noted: 2018-02-05

## 2018-02-05 LAB
ANION GAP SERPL CALCULATED.3IONS-SCNC: 10 MMOL/L (ref 4–13)
BASOPHILS # BLD AUTO: 0.03 THOUSANDS/ΜL (ref 0–0.1)
BASOPHILS NFR BLD AUTO: 0 % (ref 0–1)
BUN SERPL-MCNC: 9 MG/DL (ref 5–25)
CALCIUM SERPL-MCNC: 8 MG/DL (ref 8.3–10.1)
CHLORIDE SERPL-SCNC: 105 MMOL/L (ref 100–108)
CO2 SERPL-SCNC: 22 MMOL/L (ref 21–32)
CREAT SERPL-MCNC: 0.75 MG/DL (ref 0.6–1.3)
EOSINOPHIL # BLD AUTO: 0 THOUSAND/ΜL (ref 0–0.61)
EOSINOPHIL NFR BLD AUTO: 0 % (ref 0–6)
ERYTHROCYTE [DISTWIDTH] IN BLOOD BY AUTOMATED COUNT: 13.5 % (ref 11.6–15.1)
FLUAV AG SPEC QL: DETECTED
FLUBV AG SPEC QL: ABNORMAL
GFR SERPL CREATININE-BSD FRML MDRD: 100 ML/MIN/1.73SQ M
GLUCOSE SERPL-MCNC: 91 MG/DL (ref 65–140)
HCT VFR BLD AUTO: 44 % (ref 36.5–49.3)
HGB BLD-MCNC: 15.4 G/DL (ref 12–17)
LYMPHOCYTES # BLD AUTO: 1.42 THOUSANDS/ΜL (ref 0.6–4.47)
LYMPHOCYTES NFR BLD AUTO: 17 % (ref 14–44)
MCH RBC QN AUTO: 33.4 PG (ref 26.8–34.3)
MCHC RBC AUTO-ENTMCNC: 35 G/DL (ref 31.4–37.4)
MCV RBC AUTO: 95 FL (ref 82–98)
MONOCYTES # BLD AUTO: 1.68 THOUSAND/ΜL (ref 0.17–1.22)
MONOCYTES NFR BLD AUTO: 20 % (ref 4–12)
NEUTROPHILS # BLD AUTO: 5.32 THOUSANDS/ΜL (ref 1.85–7.62)
NEUTS SEG NFR BLD AUTO: 63 % (ref 43–75)
NRBC BLD AUTO-RTO: 0 /100 WBCS
PLATELET # BLD AUTO: 154 THOUSANDS/UL (ref 149–390)
PMV BLD AUTO: 9.9 FL (ref 8.9–12.7)
POTASSIUM SERPL-SCNC: 3 MMOL/L (ref 3.5–5.3)
RBC # BLD AUTO: 4.61 MILLION/UL (ref 3.88–5.62)
RSV B RNA SPEC QL NAA+PROBE: ABNORMAL
SODIUM SERPL-SCNC: 137 MMOL/L (ref 136–145)
WBC # BLD AUTO: 8.48 THOUSAND/UL (ref 4.31–10.16)

## 2018-02-05 PROCEDURE — 99233 SBSQ HOSP IP/OBS HIGH 50: CPT | Performed by: INTERNAL MEDICINE

## 2018-02-05 PROCEDURE — 80048 BASIC METABOLIC PNL TOTAL CA: CPT | Performed by: INTERNAL MEDICINE

## 2018-02-05 PROCEDURE — 85025 COMPLETE CBC W/AUTO DIFF WBC: CPT | Performed by: INTERNAL MEDICINE

## 2018-02-05 RX ORDER — OSELTAMIVIR PHOSPHATE 75 MG/1
75 CAPSULE ORAL EVERY 12 HOURS SCHEDULED
Status: DISCONTINUED | OUTPATIENT
Start: 2018-02-05 | End: 2018-02-10 | Stop reason: HOSPADM

## 2018-02-05 RX ORDER — POTASSIUM CHLORIDE 20 MEQ/1
40 TABLET, EXTENDED RELEASE ORAL 2 TIMES DAILY
Status: DISCONTINUED | OUTPATIENT
Start: 2018-02-05 | End: 2018-02-10 | Stop reason: HOSPADM

## 2018-02-05 RX ORDER — LORAZEPAM 2 MG/ML
1 INJECTION INTRAMUSCULAR EVERY 6 HOURS PRN
Status: DISCONTINUED | OUTPATIENT
Start: 2018-02-05 | End: 2018-02-10 | Stop reason: HOSPADM

## 2018-02-05 RX ORDER — POTASSIUM CHLORIDE 20 MEQ/1
40 TABLET, EXTENDED RELEASE ORAL ONCE
Status: DISCONTINUED | OUTPATIENT
Start: 2018-02-06 | End: 2018-02-05

## 2018-02-05 RX ADMIN — FOLIC ACID 1 MG: 1 TABLET ORAL at 09:21

## 2018-02-05 RX ADMIN — CHLORDIAZEPOXIDE HYDROCHLORIDE 25 MG: 25 CAPSULE ORAL at 09:21

## 2018-02-05 RX ADMIN — POTASSIUM CHLORIDE 40 MEQ: 1500 TABLET, EXTENDED RELEASE ORAL at 11:32

## 2018-02-05 RX ADMIN — CEFEPIME HYDROCHLORIDE 1000 MG: 1 INJECTION, SOLUTION INTRAVENOUS at 09:23

## 2018-02-05 RX ADMIN — ACETAMINOPHEN 650 MG: 325 TABLET, FILM COATED ORAL at 17:42

## 2018-02-05 RX ADMIN — OSELTAMIVIR PHOSPHATE 75 MG: 75 CAPSULE ORAL at 13:57

## 2018-02-05 RX ADMIN — SENNOSIDES A AND B 8.8 MG: 415.36 LIQUID ORAL at 09:21

## 2018-02-05 RX ADMIN — CHLORDIAZEPOXIDE HYDROCHLORIDE 25 MG: 25 CAPSULE ORAL at 20:56

## 2018-02-05 RX ADMIN — ACETAMINOPHEN 650 MG: 325 TABLET, FILM COATED ORAL at 02:58

## 2018-02-05 RX ADMIN — Medication 100 MG: at 09:21

## 2018-02-05 RX ADMIN — POTASSIUM CHLORIDE 40 MEQ: 1500 TABLET, EXTENDED RELEASE ORAL at 17:40

## 2018-02-05 RX ADMIN — OSELTAMIVIR PHOSPHATE 75 MG: 75 CAPSULE ORAL at 20:56

## 2018-02-05 RX ADMIN — LORAZEPAM 1 MG: 2 INJECTION INTRAMUSCULAR; INTRAVENOUS at 03:42

## 2018-02-05 RX ADMIN — DEXTROSE AND SODIUM CHLORIDE 125 ML/HR: 5; .9 INJECTION, SOLUTION INTRAVENOUS at 00:00

## 2018-02-05 RX ADMIN — CHLORDIAZEPOXIDE HYDROCHLORIDE 25 MG: 25 CAPSULE ORAL at 13:56

## 2018-02-05 RX ADMIN — DEXTROSE AND SODIUM CHLORIDE 125 ML/HR: 5; .9 INJECTION, SOLUTION INTRAVENOUS at 19:53

## 2018-02-05 NOTE — CONSULTS
Consultation - Infectious Disease   Jean Claude Amaya 63 y o  male MRN: [id number]  Unit/Bed#: [facility] 834-01 Encounter: [id number]      Inpatient consult to Infectious Diseases  Consult performed by: Leola Crenshaw  Consult ordered by: Helen Ramsey          IMPRESSION & RECOMMENDATIONS:   Impression:  1  Fever R/0 occult infection versus ETOH withdrawal, ETOH hepatitis  2  Acute ETOH withdrawal syndrome    Recommendations:    Discuss with the primary service  1   Check results of blood and in urine cultures as well as influenza/RSV PCR  2  Pending above will begin cefepime 1 g q 12 hours IV to cover the potential for occult sepsis      HISTORY OF PRESENT ILLNESS:    Reason for Consult:  Fever with ETOH withdrawal and LFT elevation  HPI: Jean Claude Amaya is a 63y o  year old male who is currently disoriented and unable to give a clear history  The history was obtained from the chart  He was admitted on 2/2/18 after apparently being sent from work where he was randomly drug tested with a positive urine test marijuana  At [facility] he was noted to be tremulous and diaphoretic and admitted to heavy ETOH consumption but had not had any alcohol for at least 36 hours  On evaluation here he was felt to be in acute ETOH withdrawal and admitted  He has not had an elevated WBC but his fever spiked to 101 5 today with AST of 207 and ALT of 130 on 02/02  Review of Systems   Unable to perform ROS: Mental status change         PAST MEDICAL HISTORY:  History reviewed  No pertinent past medical history  History reviewed  No pertinent surgical history      FAMILY HISTORY:  Non-contributory    SOCIAL HISTORY:  Social History   Single  History   Alcohol Use    1 2 oz/week    2 Cans of beer per week     History   Drug Use    Types: Marijuana     History   Smoking Status    Former Smoker   Smokeless Tobacco    Never Used       ALLERGIES:  No Known Allergies    MEDICATIONS:  All current active medications have been reviewed  PHYSICAL EXAM:  HR:  [] 105  Resp:  [16-22] 22  BP: (145-173)/() 171/91  SpO2:  [94 %-98 %] 96 %  Temp (24hrs), Av 5 °F (37 5 °C), Min:98 °F (36 7 °C), Max:101 5 °F (38 6 °C)  Current: Temperature: 98 9 °F (37 2 °C)    Intake/Output Summary (Last 24 hours) at 18 1911  Last data filed at 18 1728   Gross per 24 hour   Intake          5242 09 ml   Output             2159 ml   Net          3083 09 ml       General Appearance:  Appearing confused with some agitation, nontoxic, and in no distress, appears stated age   Head:  Normocephalic, without obvious abnormality, atraumatic   Eyes:  PERRL, conjunctiva pink and sclera anicteric, both eyes   Nose: Nares normal, mucosa normal, no drainage   Throat: Oropharynx moist without lesions; lips, mucosa, and tongue normal; teeth and gums normal   Neck: Supple, symmetrical, trachea midline, no adenopathy, no tenderness/mass/nodules   Back:   Symmetric, no curvature, ROM normal, no CVA tenderness   Lungs:   Clear to auscultation bilaterally, no audible wheezes, rhonchi and rales, respirations unlabored   Chest Wall:  No tenderness or deformity   Heart:  Regular rate and rhythm, S1, S2 normal, no murmur, rub or gallop   Abdomen:   Soft, non-tender, non-distended, positive bowel sounds, no masses, no organomegaly    No CVA tenderness   Extremities: Extremities normal, atraumatic, no cyanosis, clubbing or edema   Skin: Has facial and upper truncal erythema   Neurologic: Alert but confused with agitation and tremulousness and mild asterixis           Invasive Devices:   Peripheral IV 18 Left Forearm (Active)   Site Assessment Clean;Dry; Intact 2018  9:30 AM   Dressing Type Transparent;Gauze 2018  9:30 AM   Line Status Infusing 2018  9:30 AM   Dressing Status Clean;Dry; Intact 2018  9:30 AM   Dressing Intervention Dressing reinforced 2/3/2018  8:00 PM   Dressing Change Due 02/07/18 2/3/2018  8:00 PM       LABS, IMAGING, & OTHER STUDIES:  Lab Results:      I have personally reviewed pertinent labs  Results from last 7 days  Lab Units 02/04/18  1709 02/02/18  1454   WBC Thousand/uL 7 09 6 70   HEMOGLOBIN g/dL 17 1* 15 7   PLATELETS Thousands/uL 168 160       Results from last 7 days  Lab Units 02/04/18  0557 02/03/18  0509 02/02/18  1454   SODIUM mmol/L 137 140 139   POTASSIUM mmol/L 3 6 3 1* 3 7   CHLORIDE mmol/L 103 106 103   CO2 mmol/L 27 25 24   ANION GAP mmol/L 7 9 12   BUN mg/dL 7 6 8   CREATININE mg/dL 0 73 0 70 1 08   EGFR ml/min/1 73sq m 101 103 74   GLUCOSE RANDOM mg/dL 79 85 112   CALCIUM mg/dL 8 2* 7 9* 8 9   AST U/L  --   --  207*   ALT U/L  --   --  130*   ALK PHOS U/L  --   --  110   TOTAL PROTEIN g/dL  --   --  8 1   BILIRUBIN TOTAL mg/dL  --   --  1 85*           Imaging Studies:   I have personally reviewed pertinent imaging study reports and images in PACS  EKG, Pathology, and Other Studies:   I have personally reviewed pertinent reports

## 2018-02-05 NOTE — ASSESSMENT & PLAN NOTE
influenza A positive  He was have temperature elevation, tachycardia, high blood pressure   Started on Tamiflu now

## 2018-02-05 NOTE — PLAN OF CARE
Problem: DISCHARGE PLANNING - CARE MANAGEMENT  Goal: Discharge to post-acute care or home with appropriate resources  INTERVENTIONS:  - Conduct assessment to determine patient/family and health care team treatment goals, and need for post-acute services based on payer coverage, community resources, and patient preferences, and barriers to discharge  - Address psychosocial, clinical, and financial barriers to discharge as identified in assessment in conjunction with the patient/family and health care team  - Arrange appropriate level of post-acute services according to patient's   needs and preference and payer coverage in collaboration with the physician and health care team  - Communicate with and update the patient/family, physician, and health care team regarding progress on the discharge plan  - Arrange appropriate transportation to post-acute venues  - Assess for HOST referral  Outcome: Progressing

## 2018-02-05 NOTE — ASSESSMENT & PLAN NOTE
· The consult pending  · Currently denying suicidal ideation  · He also was agitated due to withdrawal from alcohol  · Continue with one-to-one until cleared by psych

## 2018-02-05 NOTE — PROGRESS NOTES
Progress Note - Infectious Disease   Andrés Sis 63 y o  male MRN: [id number]  Unit/Bed#: [facility] 834-01 Encounter: [id number]      Impression:  1  Influenza A  2  ETOH withdrawal    Recommendations:  1  Agree with Tamiflu  2  Will discontinue cefepime    Antibiotics:  1  Tamiflu, day 1    Subjective:  He is fatigued but denies shortness of breath or significant cough  Denies fevers, chills, or sweats  Denies nausea, vomiting, or diarrhea  Objective:  Vitals:  HR:  [] 95  Resp:  [18-22] 18  BP: (138-173)/() 151/87  SpO2:  [94 %-96 %] 95 %  Temp (24hrs), Av 7 °F (37 6 °C), Min:97 6 °F (36 4 °C), Max:101 5 °F (38 6 °C)  Current: Temperature: 97 6 °F (36 4 °C)    Physical Exam:     General Appearance:  Alert, fatigued with facial erythema and mild tremulousness, nontoxic, no acute distress  Throat: Oropharynx moist without lesions  Lips, mucosa, and tongue normal   Neck: Supple, symmetrical, trachea midline, no adenopathy,  no tenderness/mass/nodules   Lungs:   Clear to auscultation bilaterally, no audible wheezes, rhonchi or rales; respirations unlabored   Heart:  Regular rate and rhythm, S1, S2 normal, no murmur, rub or gallop   Abdomen:   Soft, non-tender, non-distended, positive bowel sounds    No masses, no organomegaly    No CVA tenderness   Extremities: Extremities normal, atraumatic, no clubbing, cyanosis or edema, mild tremulousness   Skin: Facial and upper body erythema         Invasive Devices     Peripheral Intravenous Line            Peripheral IV 18 Left Forearm 1 day                Labs, Imaging, & Other studies:   All pertinent labs were personally reviewed    Results from last 7 days  Lab Units 18  0436 18  1709 18  1454   WBC Thousand/uL 8 48 7 09 6 70   HEMOGLOBIN g/dL 15 4 17 1* 15 7   PLATELETS Thousands/uL 154 168 160       Results from last 7 days  Lab Units 18  0436 18  1947 18  0557  18  1454   SODIUM mmol/L 137 136 137  < > 139   POTASSIUM mmol/L 3 0* 3 3* 3 6  < > 3 7   CHLORIDE mmol/L 105 104 103  < > 103   CO2 mmol/L 22 25 27  < > 24   ANION GAP mmol/L 10 7 7  < > 12   BUN mg/dL 9 10 7  < > 8   CREATININE mg/dL 0 75 0 80 0 73  < > 1 08   EGFR ml/min/1 73sq m 100 97 101  < > 74   GLUCOSE RANDOM mg/dL 91 88 79  < > 112   CALCIUM mg/dL 8 0* 8 3 8 2*  < > 8 9   AST U/L  --  104*  --   --  207*   ALT U/L  --  79*  --   --  130*   ALK PHOS U/L  --  84  --   --  110   TOTAL PROTEIN g/dL  --  7 3  --   --  8 1   BILIRUBIN TOTAL mg/dL  --  1 18*  --   --  1 85*   < > = values in this interval not displayed      Results from last 7 days  Lab Units 02/04/18 2017   INFLUENZA A PCR  Detected*   INFLUENZA B PCR  None Detected   RSV PCR  None Detected

## 2018-02-05 NOTE — PROGRESS NOTES
Progress Note - Tabby Espino 1955, 63 y o  male MRN: [id number]    Unit/Bed#: [facility] 834-01 Encounter: [id number]    Primary Care Provider: Fuentes Stock DO   Date and time admitted to hospital: 2/2/2018  1:39 PM        Viral sepsis [facility])   Assessment & Plan    influenza A positive  He was have temperature elevation, tachycardia, high blood pressure   Started on Tamiflu now            Major depressive disorder   Assessment & Plan    · The consult pending  · Currently denying suicidal ideation  · He also was agitated due to withdrawal from alcohol  · Continue with one-to-one until cleared by psych        Elevated liver enzymes   Assessment & Plan    · Elevated t bili 1 85; ; - likely secondary to alcohol use-improving  · Monitor daily  · F/u GI outpatient after discharge         Hypokalemia   Assessment & Plan    · Replete with 40meq kdur, 20 meq given this morning  · Monitor daily bmp          * Alcohol withdrawal syndrome without complication (HCC)   Assessment & Plan    · C/w Librium 25mg attempt to wean to q8h with p r n if neeed  · IV beta-blocker prn  · C/w IV fluids  · thiamine and folate  · Ativan q4hrly as needed, give for hypertension/agitation/tachycardia, it is hard as this patient also has sepsis  · Monitor in and output  · Daily weights  · psychiatry evaluation for alcohol withdrawal counseling, pt interested in rehab and also patient with evidence of depression and suicidal ideation last week  Currently denies any suicidal ideations at this time  · UDS (-), BAC (-)               VTE Pharmacologic Prophylaxis:   Pharmacologic: Enoxaparin (Lovenox)  Mechanical VTE Prophylaxis in Place: Yes    Patient Centered Rounds: I have performed bedside rounds with nursing staff today  Discussions with Specialists or Other Care Team Provider: psych, ID    Education and Discussions with Family / Patient: patient    Time Spent for Care: 45 minutes    More than 50% of total time spent on counseling and coordination of care as described above  Current Length of Stay: 2 day(s)    Current Patient Status: Inpatient   Certification Statement: The patient will continue to require additional inpatient hospital stay due to influenza A positive, completing withdrawl protocol    Discharge Plan: would need medically clear for discharge  Code Status: Level 1 - Full Code      Subjective:   I am feeling un well today  I am very tired  Objective:     Vitals:   Temp (24hrs), Av 7 °F (37 6 °C), Min:97 6 °F (36 4 °C), Max:101 5 °F (38 6 °C)    HR:  [] 95  Resp:  [18-22] 18  BP: (138-173)/() 151/87  SpO2:  [94 %-96 %] 95 %  Body mass index is 23 57 kg/m²  Input and Output Summary (last 24 hours): Intake/Output Summary (Last 24 hours) at 18 1001  Last data filed at 18 2358   Gross per 24 hour   Intake          2136 67 ml   Output              506 ml   Net          1630 67 ml       Physical Exam:     Physical Exam   Constitutional: He is oriented to person, place, and time  He appears well-developed and well-nourished  No distress  HENT:   Head: Normocephalic and atraumatic  Mouth/Throat: No oropharyngeal exudate  Eyes: Conjunctivae and EOM are normal  Pupils are equal, round, and reactive to light  Right eye exhibits no discharge  Left eye exhibits no discharge  No scleral icterus  Neck: Normal range of motion  Neck supple  No JVD present  No tracheal deviation present  No thyromegaly present  Cardiovascular: Normal rate, regular rhythm, normal heart sounds and intact distal pulses  Exam reveals no gallop and no friction rub  No murmur heard  Pulmonary/Chest: Effort normal and breath sounds normal  No stridor  No respiratory distress  He has no wheezes  He has no rales  He exhibits no tenderness  Abdominal: Soft  Bowel sounds are normal  He exhibits no distension and no mass  There is no tenderness  There is no rebound and no guarding     Musculoskeletal: Normal range of motion  He exhibits no edema, tenderness or deformity  Lymphadenopathy:     He has no cervical adenopathy  Neurological: He is alert and oriented to person, place, and time  He has normal reflexes  He displays normal reflexes  No cranial nerve deficit  He exhibits normal muscle tone  Coordination normal    Skin: Skin is warm and dry  No rash noted  He is not diaphoretic  There is erythema  No pallor  Psychiatric: His behavior is normal  Thought content normal    Lethargic, he feels not well   Nursing note and vitals reviewed  Additional Data:     Labs:      Results from last 7 days  Lab Units 02/05/18  0436   WBC Thousand/uL 8 48   HEMOGLOBIN g/dL 15 4   HEMATOCRIT % 44 0   PLATELETS Thousands/uL 154   NEUTROS PCT % 63   LYMPHS PCT % 17   MONOS PCT % 20*   EOS PCT % 0       Results from last 7 days  Lab Units 02/05/18  0436 02/04/18  1947   SODIUM mmol/L 137 136   POTASSIUM mmol/L 3 0* 3 3*   CHLORIDE mmol/L 105 104   CO2 mmol/L 22 25   BUN mg/dL 9 10   CREATININE mg/dL 0 75 0 80   CALCIUM mg/dL 8 0* 8 3   TOTAL PROTEIN g/dL  --  7 3   BILIRUBIN TOTAL mg/dL  --  1 18*   ALK PHOS U/L  --  84   ALT U/L  --  79*   AST U/L  --  104*   GLUCOSE RANDOM mg/dL 91 88           * I Have Reviewed All Lab Data Listed Above  * Additional Pertinent Lab Tests Reviewed:  [address] Admission Reviewed    Imaging:    Imaging Reports Reviewed Today Include:    Imaging Personally Reviewed by Myself Includes:      Recent Cultures (last 7 days):       Results from last 7 days  Lab Units 02/04/18  2017   INFLUENZA A PCR  Detected*   INFLUENZA B PCR  None Detected   RSV PCR  None Detected       Last 24 Hours Medication List:     Current Facility-Administered Medications:  acetaminophen 650 mg Oral Q6H PRN Carlo Kidd MD    cefepime 1,000 mg Intravenous Q12H Luis Ontiveros MD Last Rate: 1,000 mg (02/05/18 0923)   chlordiazePOXIDE 25 mg Oral Q6H [address] Montserrat Calderon MD    dextrose 5 % and sodium chloride 0 9 % 125 mL/hr Intravenous Continuous Barbara Madrigal MD Last Rate: 125 mL/hr (02/05/18 0000)   diphenhydrAMINE 25 mg Intravenous Q6H PRN BRY Benitez    folic acid 1 mg Oral Daily Erick Sutherland MD    LORazepam 1 mg Intravenous Q6H PRN Chaya Adams MD    metoprolol 5 mg Intravenous Q6H PRN Idaabiel Sutherland MD    ondansetron 4 mg Intravenous Q6H PRN Barbara Madrigal MD    oseltamivir 75 mg Oral Q12H [facility] Chaya Adams MD    potassium chloride 40 mEq Oral BID Chaya Adams MD    senna 8 8 mg Oral Daily Barbara Madrigal MD    simethicone 80 mg Oral 4x Daily PRN Barbara Madrigal MD    thiamine 100 mg Oral Daily BRY Shankar         Today, Patient Was Seen By: Chaya Adams MD    ** Please Note: Dictation voice to text software may have been used in the creation of this document   **

## 2018-02-05 NOTE — SOCIAL WORK
Cm received call from Todd Lewis from [address]  at [address]  They are requesting for medical team to contact Dr Marla Fabian, the director for Occupational Medicine at [facility], for update on patient's plan of care before patient returns to program   Clinic # [phone] or Office # [phone]  Cm informed medical team   Medical team to call and speak with Dr Marla Fabian  Cm continues to follow

## 2018-02-05 NOTE — ASSESSMENT & PLAN NOTE
· Elevated t bili 1 85; ;  - likely secondary to alcohol use-improving  · Monitor daily  · F/u GI outpatient after discharge

## 2018-02-05 NOTE — ASSESSMENT & PLAN NOTE
· C/w Librium 25mg attempt to wean to q8h with p r n if neeed  · IV beta-blocker prn  · C/w IV fluids  · thiamine and folate  · Ativan q4hrly as needed, give for hypertension/agitation/tachycardia, it is hard as this patient also has sepsis  · Monitor in and output  · Daily weights  · psychiatry evaluation for alcohol withdrawal counseling, pt interested in rehab and also patient with evidence of depression and suicidal ideation last week    Currently denies any suicidal ideations at this time  · UDS (-), BAC (-)

## 2018-02-05 NOTE — CASE MANAGEMENT
Thank you,  [facility] in the [facility] by [address] for 2017  Network Utilization Review Department  Phone: [phone]; Fax [phone]  ATTENTION: The Network Utilization Review Department is now centralized for our 7 Facilities  Make a note that we have a new phone and fax numbers for our Department  Please call with any questions or concerns to [phone] and carefully follow the prompts so that you are directed to the right person  All voicemails are confidential  Fax any determinations, approvals, denials, and requests for initial or continue stay review clinical to [phone]  Due to HIGH CALL volume, it would be easier if you could please send faxed requests to expedite your requests and in part, help us provide discharge notifications faster  Continued Stay Review    Pt was initially OBSERVATION and changed to INPATIENT ON 2/3/18 @ 21   02/03/18 1448  Inpatient Admission Once     Transfer Service: General Medicine       Question Answer Comment   Admitting Physician DEDRICK PIPER    Level of Care Med Surg    Estimated length of stay More than 2 Midnights    Certification I certify that inpatient services are medically necessary for this patient for a duration of greater than two midnights  See H&P and MD Progress Notes for additional information about the patient's course of treatment          02/03/18 1448     Date: 2/5/18    Vital Signs: /65 (BP Location: Right arm)   Pulse 96   Temp (!) 101 9 °F (38 8 °C) (Oral) Comment: rn aware   Resp 18   Ht 6' 1 5" (1 867 m)   Wt 82 1 kg (181 lb 1 6 oz)   SpO2 93%   BMI 23 57 kg/m²     Medications:   Scheduled Meds:   Current Facility-Administered Medications:  acetaminophen 650 mg Oral Q6H PRN Michael Valentine MD    chlordiazePOXIDE 25 mg Oral Q6H [address] Jose Rivero MD    dextrose 5 % and sodium chloride 0 9 % 125 mL/hr Intravenous Continuous Michael Valentine MD Last Rate: 125 mL/hr (02/05/18 0000)   diphenhydrAMINE 25 mg Intravenous Q6H PRN BRY Nichole    folic acid 1 mg Oral Daily Shelly Miranda MD    LORazepam 1 mg Intravenous Q6H PRN Alissa Toussaint MD    metoprolol 5 mg Intravenous Q6H PRN Shelly Miranda MD    ondansetron 4 mg Intravenous Q6H PRN Shelly Miranda MD    oseltamivir 75 mg Oral Q12H [address] Alissa Toussaint MD    potassium chloride 40 mEq Oral BID Alissa Toussaint MD    senna 8 8 mg Oral Daily Shelly Miranda MD    simethicone 80 mg Oral 4x Daily PRN Shelly Miranda MD    thiamine 100 mg Oral Daily BRY Vernon      Continuous Infusions:   dextrose 5 % and sodium chloride 0 9 % 125 mL/hr Last Rate: 125 mL/hr (02/05/18 0000)     PRN Meds:   acetaminophen    diphenhydrAMINE    LORazepam    metoprolol    ondansetron    simethicone    Abnormal Labs/Diagnostic Results:     K 3 0  Calcium 8 0  Influenza A detected 2/4 02/04/18 2319   Color, UA Yellow    Clarity, UA Clear    Specific Gravity, UA 1 017    pH, UA 6 0    Leukocytes, UA  Negative    Nitrite, UA  Negative    Protein, UA 30 (1+)     Glucose, UA  Negative    Ketones, UA Trace     Urobilinogen, UA 1 0    Bilirubin, UA  Negative    Blood, UA Trace       Age/Sex: 63 y o  male     Assessment/Plan:   2/5 Medicine Progress Note         Viral sepsis (HCC)   Assessment & Plan       Major depressive disorder   Assessment & Plan     · The consult pending  · Currently denying suicidal ideation  · He also was agitated due to withdrawal from alcohol  · Continue with one-to-one until cleared by psych          Elevated liver enzymes   Assessment & Plan     · Elevated t bili 1 85; ;  - likely secondary to alcohol use-improving  · Monitor daily  · F/u GI outpatient after discharge           Hypokalemia   Assessment & Plan     · Replete with 40meq kdur, 20 meq given this morning  · Monitor daily bmp            * Alcohol withdrawal syndrome without complication [facility])   Assessment & Plan     · C/w Librium 25mg attempt to wean to q8h with p r n if neeed  · IV beta-blocker prn  · C/w IV fluids  · thiamine and folate  · Ativan q4hrly as needed, give for hypertension/agitation/tachycardia, it is hard as this patient also has sepsis  · Monitor in and output  · Daily weights  · psychiatry evaluation for alcohol withdrawal counseling, pt interested in rehab and also patient with evidence of depression and suicidal ideation last week  Currently denies any suicidal ideations at this time  · UDS (-), BAC (-)              VTE Pharmacologic Prophylaxis:   Pharmacologic: Enoxaparin (Lovenox)  Mechanical VTE Prophylaxis in Place: Yes   Current Patient Status: Inpatient   Certification Statement: The patient will continue to require additional inpatient hospital stay due to influenza A positive, completing withdrawl protocol     Discharge Plan: TBD  __________________________  2/5 ID Progress Note  Impression:  1  Influenza A  2  ETOH withdrawal     Recommendations:  1  Agree with Tamiflu  2  Will discontinue cefepime     Antibiotics:  1    Tamiflu, day 1

## 2018-02-05 NOTE — SOCIAL WORK
CM attempted to meet w/patient throughout day  Pt is confused  Pt is on a 1:1  Per mother pt working, lives alone in 2 floor house; 3 SONNY  Independent in ADLs, No DME  Denies HHC, No IP treatment for PT/OT, mental health/drug and alcohol  F[facility]  Pt's condition prohibited discussion re: HOST referral  CM should follow up w/pt in this regard tomorrow  Emergency contact is mother Ravinder [place] [phone]

## 2018-02-05 NOTE — PLAN OF CARE
BEHAVIOR     Pt/Family maintain appropriate behavior and adhere to behavioral management agreement, if implemented Progressing        COPING     Will report anxiety at manageable levels Progressing        Depression - IP adult     Effects of depression will be minimized Progressing        DISCHARGE PLANNING - CARE MANAGEMENT     Discharge to post-acute care or home with appropriate resources Progressing        Knowledge Deficit     Patient/family/caregiver demonstrates understanding of disease process, treatment plan, medications, and discharge instructions Progressing        METABOLIC, FLUID AND ELECTROLYTES - ADULT     Electrolytes maintained within normal limits Progressing     Fluid balance maintained Progressing        NEUROSENSORY - ADULT     Achieves stable or improved neurological status Progressing     Achieves maximal functionality and self care Progressing        Potential for Falls     Patient will remain free of falls Progressing        SAFETY ADULT     Patient will remain free of falls Progressing     Maintain or return to baseline ADL function Progressing     Maintain or return mobility status to optimal level Progressing        SELF HARM     Effect of psychiatric condition will be minimized and patient will be protected from self harm Progressing

## 2018-02-06 LAB
ANION GAP SERPL CALCULATED.3IONS-SCNC: 8 MMOL/L (ref 4–13)
BASOPHILS # BLD AUTO: 0.05 THOUSANDS/ΜL (ref 0–0.1)
BASOPHILS NFR BLD AUTO: 1 % (ref 0–1)
BUN SERPL-MCNC: 15 MG/DL (ref 5–25)
CALCIUM SERPL-MCNC: 7.7 MG/DL (ref 8.3–10.1)
CHLORIDE SERPL-SCNC: 110 MMOL/L (ref 100–108)
CO2 SERPL-SCNC: 24 MMOL/L (ref 21–32)
CREAT SERPL-MCNC: 0.85 MG/DL (ref 0.6–1.3)
EOSINOPHIL # BLD AUTO: 0.24 THOUSAND/ΜL (ref 0–0.61)
EOSINOPHIL NFR BLD AUTO: 3 % (ref 0–6)
ERYTHROCYTE [DISTWIDTH] IN BLOOD BY AUTOMATED COUNT: 13.6 % (ref 11.6–15.1)
GFR SERPL CREATININE-BSD FRML MDRD: 95 ML/MIN/1.73SQ M
GLUCOSE SERPL-MCNC: 84 MG/DL (ref 65–140)
HCT VFR BLD AUTO: 40.8 % (ref 36.5–49.3)
HGB BLD-MCNC: 13.9 G/DL (ref 12–17)
LYMPHOCYTES # BLD AUTO: 1.99 THOUSANDS/ΜL (ref 0.6–4.47)
LYMPHOCYTES NFR BLD AUTO: 23 % (ref 14–44)
MCH RBC QN AUTO: 33 PG (ref 26.8–34.3)
MCHC RBC AUTO-ENTMCNC: 34.1 G/DL (ref 31.4–37.4)
MCV RBC AUTO: 97 FL (ref 82–98)
MONOCYTES # BLD AUTO: 1.48 THOUSAND/ΜL (ref 0.17–1.22)
MONOCYTES NFR BLD AUTO: 17 % (ref 4–12)
NEUTROPHILS # BLD AUTO: 5.06 THOUSANDS/ΜL (ref 1.85–7.62)
NEUTS SEG NFR BLD AUTO: 56 % (ref 43–75)
NRBC BLD AUTO-RTO: 0 /100 WBCS
PLATELET # BLD AUTO: 146 THOUSANDS/UL (ref 149–390)
PMV BLD AUTO: 9.8 FL (ref 8.9–12.7)
POTASSIUM SERPL-SCNC: 3.6 MMOL/L (ref 3.5–5.3)
RBC # BLD AUTO: 4.21 MILLION/UL (ref 3.88–5.62)
SODIUM SERPL-SCNC: 142 MMOL/L (ref 136–145)
WBC # BLD AUTO: 8.84 THOUSAND/UL (ref 4.31–10.16)

## 2018-02-06 PROCEDURE — 85025 COMPLETE CBC W/AUTO DIFF WBC: CPT | Performed by: INTERNAL MEDICINE

## 2018-02-06 PROCEDURE — 80048 BASIC METABOLIC PNL TOTAL CA: CPT | Performed by: INTERNAL MEDICINE

## 2018-02-06 PROCEDURE — 99232 SBSQ HOSP IP/OBS MODERATE 35: CPT | Performed by: INTERNAL MEDICINE

## 2018-02-06 RX ADMIN — POTASSIUM CHLORIDE 40 MEQ: 1500 TABLET, EXTENDED RELEASE ORAL at 17:22

## 2018-02-06 RX ADMIN — CHLORDIAZEPOXIDE HYDROCHLORIDE 25 MG: 25 CAPSULE ORAL at 13:48

## 2018-02-06 RX ADMIN — FOLIC ACID 1 MG: 1 TABLET ORAL at 07:49

## 2018-02-06 RX ADMIN — CHLORDIAZEPOXIDE HYDROCHLORIDE 25 MG: 25 CAPSULE ORAL at 02:25

## 2018-02-06 RX ADMIN — CHLORDIAZEPOXIDE HYDROCHLORIDE 25 MG: 25 CAPSULE ORAL at 21:21

## 2018-02-06 RX ADMIN — CHLORDIAZEPOXIDE HYDROCHLORIDE 25 MG: 25 CAPSULE ORAL at 07:53

## 2018-02-06 RX ADMIN — SENNOSIDES A AND B 8.8 MG: 415.36 LIQUID ORAL at 07:51

## 2018-02-06 RX ADMIN — Medication 100 MG: at 07:49

## 2018-02-06 RX ADMIN — DEXTROSE AND SODIUM CHLORIDE 125 ML/HR: 5; .9 INJECTION, SOLUTION INTRAVENOUS at 02:10

## 2018-02-06 RX ADMIN — OSELTAMIVIR PHOSPHATE 75 MG: 75 CAPSULE ORAL at 21:21

## 2018-02-06 RX ADMIN — POTASSIUM CHLORIDE 40 MEQ: 1500 TABLET, EXTENDED RELEASE ORAL at 07:49

## 2018-02-06 RX ADMIN — OSELTAMIVIR PHOSPHATE 75 MG: 75 CAPSULE ORAL at 07:49

## 2018-02-06 NOTE — SOCIAL WORK
Cm met with patient and patient's mother to discuss discharge plan  Patient is in agreement with HOST Program referral   Cm left voicemail with necessary information for HOST Program   Cm awaiting return call from HOST and continues to work on patient's discharge plan

## 2018-02-06 NOTE — PROGRESS NOTES
[address] Internal Medicine Progress Note  Patient: Tasia Living 63 y o  male   MRN: [id number]  PCP: Anne Marie Jimenez DO  [facility]: [id number]  Date Of Visit: 02/06/18    Assessment:    Principal Problem:    Alcohol withdrawal syndrome without complication ([facility] )  Active Problems:    Hypokalemia    Elevated liver enzymes    Major depressive disorder    Viral sepsis ([facility] )      Plan:    · Influenxa A Infection with Sepsis POA -   · Tamiflu will be continued through 2/9/2018  · Supportive care - monitor for symptoms  · Oxygen - 93-98% on room air  · Monitor temps and Counts  · Alchohol Dependence / Abuse with Withdrawal -   · Librium 25 mg every 6 hours, will transition to q8 hours  Wean slowly as tolerated  · Thiamine 135 mg daily and Folic acid 1 mg daily  · Ativan PRN for withdrawal   · In terms of IV fluids, we can stop them today  · Serial Exams  · Initially patient not interested in drug / alcohol rehab, but now is agreeable after family has encouraged this  Case management working through HOST program to see what we can do in this respect  · Major Depression - Not currently on any medication for this  · Elevated Transaminases - likely due to alcohol abuse  Get CMP in am to monitor for continued improvement / normalization  · Hypokalemia - resolved  Monitor  · VTE prophylaxis - Lovenox being added  VTE Pharmacologic Prophylaxis:   Pharmacologic: Enoxaparin (Lovenox)  Mechanical VTE Prophylaxis in Place: Yes    Patient Centered Rounds: I have performed bedside rounds with nursing staff today  Discussions with Specialists or Other Care Team Provider: None    Education and Discussions with Family / Patient: updated patient's mother earlier today  Time Spent for Care: 30 minutes  More than 50% of total time spent on counseling and coordination of care as described above      Current Length of Stay: 3 day(s)    Current Patient Status: Inpatient   Certification Statement: The patient will continue to require additional inpatient hospital stay due to evaluation and treatment for influenza but also for mental status  Discharge Plan / Estimated Discharge Date: next 24 - 48 hours? Drug / alcohol rehab? Code Status: Level 1 - Full Code      Subjective: The patient denies any respiratory symptoms, arthralgias or myalgias  He has now been afebrile  He denies feeling shaky or any withdrawal   No palpitations  The patient is interested in alcohol rehab at this point  Objective:     Vitals:   Temp (24hrs), Av 2 °F (36 8 °C), Min:97 8 °F (36 6 °C), Max:98 5 °F (36 9 °C)    HR:  [75-87] 87  Resp:  [16-18] 16  BP: (101-119)/(64-73) 117/73  SpO2:  [96 %-98 %] 96 %  Body mass index is 23 57 kg/m²  Input and Output Summary (last 24 hours): Intake/Output Summary (Last 24 hours) at 18 1654  Last data filed at 18 1644   Gross per 24 hour   Intake          3469 75 ml   Output              450 ml   Net          3019 75 ml       Physical Exam:     Physical Exam   HENT:   Minimally dry oral mucosa  No lesions or exudates  Eyes: Conjunctivae are normal  Pupils are equal, round, and reactive to light  Neck: Neck supple  No JVD present  Cardiovascular: Normal rate and regular rhythm  No murmur heard  Pulmonary/Chest: Effort normal  He has no wheezes  He has no rales  Slightly coarse breath sounds more on right than left  Abdominal: Soft  Bowel sounds are normal  He exhibits no distension  There is no tenderness  Musculoskeletal: He exhibits no edema or tenderness  Neurological: He is alert  No cranial nerve deficit  He exhibits normal muscle tone  Month and year and president known  He states that he is at [facility]   No tremors of outstretched arms and no tongue fasciculations  Psychiatric: He has a normal mood and affect  Calm and cooperative  Nursing note and vitals reviewed      Additional Data: Labs:      Results from last 7 days  Lab Units 02/06/18  0432   WBC Thousand/uL 8 84   HEMOGLOBIN g/dL 13 9   HEMATOCRIT % 40 8   PLATELETS Thousands/uL 146*   NEUTROS PCT % 56   LYMPHS PCT % 23   MONOS PCT % 17*   EOS PCT % 3       Results from last 7 days  Lab Units 02/06/18  0432  02/04/18  1947   SODIUM mmol/L 142  < > 136   POTASSIUM mmol/L 3 6  < > 3 3*   CHLORIDE mmol/L 110*  < > 104   CO2 mmol/L 24  < > 25   BUN mg/dL 15  < > 10   CREATININE mg/dL 0 85  < > 0 80   CALCIUM mg/dL 7 7*  < > 8 3   TOTAL PROTEIN g/dL  --   --  7 3   BILIRUBIN TOTAL mg/dL  --   --  1 18*   ALK PHOS U/L  --   --  84   ALT U/L  --   --  79*   AST U/L  --   --  104*   GLUCOSE RANDOM mg/dL 84  < > 88   < > = values in this interval not displayed  * I Have Reviewed All Lab Data Listed Above  * Additional Pertinent Lab Tests Reviewed: [address] Admission Reviewed    Imaging:    Imaging Reports Reviewed Today Include: Chest Xray  Imaging Personally Reviewed by Myself Includes:  None    Recent Cultures (last 7 days):       Results from last 7 days  Lab Units 02/04/18  2017 02/04/18  1714 02/04/18  1708   BLOOD CULTURE   --  No Growth at 24 hrs  No Growth at 24 hrs     INFLUENZA A PCR  Detected*  --   --    INFLUENZA B PCR  None Detected  --   --    RSV PCR  None Detected  --   --        Last 24 Hours Medication List:     Current Facility-Administered Medications:  acetaminophen 650 mg Oral Q6H PRN Carlo Kidd MD    chlordiazePOXIDE 25 mg Oral Q6H [facility] Montserrat Calderon MD    dextrose 5 % and sodium chloride 0 9 % 125 mL/hr Intravenous Continuous Carlo Kidd MD Last Rate: 125 mL/hr (02/06/18 0210)   diphenhydrAMINE 25 mg Intravenous Q6H PRN BRY Warner    folic acid 1 mg Oral Daily Erick Sutherland MD    LORazepam 1 mg Intravenous Q6H PRN Montserrat Calderon MD    metoprolol 5 mg Intravenous Q6H PRN Carlo Kidd MD    ondansetron 4 mg Intravenous Q6H PRN Carlo Kidd MD oseltamivir 75 mg Oral Q12H [address] Twyla Slater MD    potassium chloride 40 mEq Oral BID Twyla Slater MD    senna 8 8 mg Oral Daily Tiffanie Whitaker MD    simethicone 80 mg Oral 4x Daily PRN Tiffanie Whitaker MD    thiamine 100 mg Oral Daily BRY Alfonso         Today, Patient Was Seen By: Pramod Garnett DO    ** Please Note: This note has been constructed using a voice recognition system   **

## 2018-02-06 NOTE — PROGRESS NOTES
Progress Note - Infectious Disease   Edwin Becker 63 y o  male MRN: [id number]  [facility] Encounter: [id number]      Impression:  1  Influenza A  2  ETOH withdrawal    Recommendations:  1  Continue Tamiflu to complete 5 days  2  Discussed with case management  Patient is willing to enter a program for his ETOH issues  They will refer      Antibiotics:  1  Tamiflu, day 2    Subjective: Had temperature earlier but now is afebrile with good appetite  Denies fevers, chills, or sweats  Denies nausea, vomiting, or diarrhea  Objective:  Vitals:  HR:  [75-96] 75  Resp:  [18] 18  BP: (101-119)/(64-73) 119/73  SpO2:  [93 %-98 %] 98 %  Temp (24hrs), Av 3 °F (37 4 °C), Min:98 2 °F (36 8 °C), Max:101 9 °F (38 8 °C)  Current: Temperature: 98 5 °F (36 9 °C)    Physical Exam:     General Appearance:  Alert, fatigued with mild tremulousness, nontoxic, no acute distress  Throat: Oropharynx moist without lesions  Lips, mucosa, and tongue normal   Neck: Supple, symmetrical, trachea midline, no adenopathy,  no tenderness/mass/nodules   Lungs:   Clear to auscultation bilaterally, no audible wheezes, rhonchi or rales; respirations unlabored   Heart:  Regular rate and rhythm, S1, S2 normal, no murmur, rub or gallop   Abdomen:   Soft, non-tender, non-distended, positive bowel sounds    No masses, no organomegaly    No CVA tenderness   Extremities: Extremities normal, atraumatic, no clubbing, cyanosis or edema, mild tremulousness   Skin: Facial and upper body erythema is now less         Invasive Devices     Peripheral Intravenous Line            Peripheral IV 18 Left Forearm 2 days                Labs, Imaging, & Other studies:   All pertinent labs were personally reviewed    Results from last 7 days  Lab Units 18  0432 18  0436 18  1709   WBC Thousand/uL 8 84 8 48 7 09   HEMOGLOBIN g/dL 13 9 15 4 17 1*   PLATELETS Thousands/uL 146* 154 168       Results from last 7 days  Lab Units 02/06/18  0432 02/05/18  0436 02/04/18  1947  02/02/18  1454   SODIUM mmol/L 142 137 136  < > 139   POTASSIUM mmol/L 3 6 3 0* 3 3*  < > 3 7   CHLORIDE mmol/L 110* 105 104  < > 103   CO2 mmol/L 24 22 25  < > 24   ANION GAP mmol/L 8 10 7  < > 12   BUN mg/dL 15 9 10  < > 8   CREATININE mg/dL 0 85 0 75 0 80  < > 1 08   EGFR ml/min/1 73sq m 95 100 97  < > 74   GLUCOSE RANDOM mg/dL 84 91 88  < > 112   CALCIUM mg/dL 7 7* 8 0* 8 3  < > 8 9   AST U/L  --   --  104*  --  207*   ALT U/L  --   --  79*  --  130*   ALK PHOS U/L  --   --  84  --  110   TOTAL PROTEIN g/dL  --   --  7 3  --  8 1   BILIRUBIN TOTAL mg/dL  --   --  1 18*  --  1 85*   < > = values in this interval not displayed  Results from last 7 days  Lab Units 02/04/18 2017 02/04/18  1714 02/04/18  1708   BLOOD CULTURE   --  No Growth at 24 hrs  No Growth at 24 hrs     INFLUENZA A PCR  Detected*  --   --    INFLUENZA B PCR  None Detected  --   --    RSV PCR  None Detected  --   --

## 2018-02-06 NOTE — SOCIAL WORK
Cm received call from Dr Odette Rick, from [place] [address]  Dr Casi Monson stated she is involved in patient's plan of care through patient's employer to complete a fitness evaluation before patient is able to return to work  Dr Casi Monson stated patient must begin inpatient rehab for alcohol treatment before returning to work and stated she was hoping he could begin treatment at the [facility] in [place]  Cm to inform HOST Program of this and clarify with Dr Casi Monson if she will be setting up patient's inpatient rehab or if Cm should make referral to inpatient rehab  She had additional questions about patient's expected d/c date  Cm to provide medical team with her contact information  Dr Odette Rick can be reached at [phone] (today) and [phone] (tomorrow)  Cm continues to work on patient's discharge plan

## 2018-02-06 NOTE — PROGRESS NOTES
Patient woke up and was AxO person, place, time and situation  Was able to hold conversation and he was very cooperative and able to converse about his life and work  Patient is now good about taking his PO meds and having his IV fluids running

## 2018-02-06 NOTE — SOCIAL WORK
Medical release form was faxed to [phone], for [address]'s Medical Information Release

## 2018-02-06 NOTE — MEDICAL STUDENT
MEDICAL STUDENT  Inpatient Progress Note for TRAINING ONLY  Not Part of Legal Medical Record       Progress Note - Burt Shin 63 y o  male MRN: [id number]    [facility] Encounter: [id number]      Assessment/Plan:  1  Alcohol withdrawal   -Per nursing notes patient was very agitated over the weekend; he was noted to also have an unsteady gate and urinating on himself, he also required 1:1 observation  Today because is alert and oriented x3, he denies anxiety or agitation     -Pt on Librium 25mg oral Q6H    -IVF rehydration   -Thiamine and folate supplementation  -PRN Ativan and Zofran for sx's   2  Influenza  -Positive for Influenza A  -Afebrile this AM will continue to monitor    -Started on Tamiflu (on day 2/5)   -ID following    3  Elevated liver enzymes  -Improved   -F/u with GI outpatient    4  Hypokalemia   -K+ 3 6, WNL today   5  History  of SI   -Police were sent to patient's house last week after he told friends he had suicidal thoughts of shooting himself; pt told police he was just angry and does not have a gun  -Pt was evaluated by psychiatry; he denies SI or HI      Disposition: Per case management note, they received a call from Dr Esme Grace who is involved in pt's care through his work; she states that he must enter inpatient rehab ([address] in [place] was suggested) for alcohol abuse treatment before returning to work  Dr Esme Grace to be called so we can answer additional questions regarding case  HPI:    Patient is 63 y o  M w/ Hx of alcohol abuse and depression who presented on 2/2/18 with symptoms of alcohol withdrawal (shaking)  Pt had been on a medical leave from work x 3 weeks due to a failed drug test  Admitted for treatment of ETOH withdrawal        Subjective: This morning Mr Ngozi Mishra has no complaints  He denies URI symptoms, chest pain, SOB, nausea/vomiting, diarrhea, abdominal pain, headaches, anxiety, agitation, hallucinations, SI or HI   He reports still being interested in treatment for substance abuse and depression  Objective:     Vitals: Blood pressure 119/73, pulse 75, temperature 98 5 °F (36 9 °C), temperature source Oral, resp  rate 18, height 6' 1 5" (1 867 m), weight 82 1 kg (181 lb 1 6 oz), SpO2 98 %  ,Body mass index is 23 57 kg/m²  Intake/Output Summary (Last 24 hours) at 02/06/18 1411  Last data filed at 02/06/18 1331   Gross per 24 hour   Intake          3469 75 ml   Output              350 ml   Net          3119 75 ml       Physical Exam: General appearance: alert and oriented, in no acute distress  Head: Normocephalic, without obvious abnormality, atraumatic  Neck: no JVD and supple, symmetrical, trachea midline  Lungs: clear to auscultation bilaterally  Heart: regular rate and rhythm  Abdomen: Soft, non-tender  Extremities: No lower extremity edema   Neurologic: Mental status: Alert, oriented, thought content appropriate     Lab Results   Component Value Date    WBC 8 84 02/06/2018    HGB 13 9 02/06/2018    HCT 40 8 02/06/2018    MCV 97 02/06/2018     (L) 02/06/2018     Lab Results   Component Value Date    GLUCOSE 84 02/06/2018    CALCIUM 7 7 (L) 02/06/2018     02/06/2018    K 3 6 02/06/2018    CO2 24 02/06/2018     (H) 02/06/2018    BUN 15 02/06/2018    CREATININE 0 85 02/06/2018     Invasive Devices     Peripheral Intravenous Line            Peripheral IV 02/03/18 Left Forearm 2 days                Lab, Imaging and other studies: I have personally reviewed pertinent reports      VTE Pharmacologic Prophylaxis: Sequential compression device (Venodyne)   VTE Mechanical Prophylaxis: sequential compression device

## 2018-02-06 NOTE — SOCIAL WORK
Cm met with patient, who agreed to sign medical release form for all medical records from this admission  Pt provided Cm with phone number for his bossYancy [phone]  Cm left voicemail for Yancy Holbrook and [facility] who provided Cm with contact information for GALINA Fonseca [phone] and Fax: [phone]  Address is [address]  Cm added this contact information to patient's medical release form and provided patient with copy  Patient reported he does not want to go to the [facility] in [place] (which was a rehab facility that Dr Aravind Ayon brought up to Cm) due to how far it is from his home  Cm left voicemail for Gosia Mims to discuss patient's to discuss this with Gosia Mims, through pt's EAP  Cm received return phone call from Gosia Mims who stated medical release information could be sent to the above contact info  MT Newsome to call Cm to further discuss patient's discharge plan  Cm to begin looking for inpatient alcohol treatment for patient  Gosia Mims stated that EAP would not cover patient's inpatient rehab stay and would have to be covered under patient's insurance  Cm to discuss this with patient, search for inpatient rehab facilities near patient's home, and speak with Jerod once she returns call  Pt's mom expressed interest in patient receiving treatment at [facility]  Cm continues to work on patient's discharge plan

## 2018-02-07 LAB
ALBUMIN SERPL BCP-MCNC: 2.3 G/DL (ref 3.5–5)
ALP SERPL-CCNC: 63 U/L (ref 46–116)
ALT SERPL W P-5'-P-CCNC: 49 U/L (ref 12–78)
ANION GAP SERPL CALCULATED.3IONS-SCNC: 6 MMOL/L (ref 4–13)
AST SERPL W P-5'-P-CCNC: 72 U/L (ref 5–45)
BILIRUB DIRECT SERPL-MCNC: 0.26 MG/DL (ref 0–0.2)
BILIRUB SERPL-MCNC: 0.61 MG/DL (ref 0.2–1)
BUN SERPL-MCNC: 11 MG/DL (ref 5–25)
CALCIUM SERPL-MCNC: 7.9 MG/DL (ref 8.3–10.1)
CHLORIDE SERPL-SCNC: 112 MMOL/L (ref 100–108)
CO2 SERPL-SCNC: 23 MMOL/L (ref 21–32)
CREAT SERPL-MCNC: 0.7 MG/DL (ref 0.6–1.3)
GFR SERPL CREATININE-BSD FRML MDRD: 103 ML/MIN/1.73SQ M
GLUCOSE SERPL-MCNC: 79 MG/DL (ref 65–140)
POTASSIUM SERPL-SCNC: 4.1 MMOL/L (ref 3.5–5.3)
PROT SERPL-MCNC: 6.1 G/DL (ref 6.4–8.2)
SODIUM SERPL-SCNC: 141 MMOL/L (ref 136–145)

## 2018-02-07 PROCEDURE — 80076 HEPATIC FUNCTION PANEL: CPT | Performed by: INTERNAL MEDICINE

## 2018-02-07 PROCEDURE — 99232 SBSQ HOSP IP/OBS MODERATE 35: CPT | Performed by: INTERNAL MEDICINE

## 2018-02-07 PROCEDURE — 80048 BASIC METABOLIC PNL TOTAL CA: CPT | Performed by: INTERNAL MEDICINE

## 2018-02-07 RX ORDER — CHLORDIAZEPOXIDE HYDROCHLORIDE 25 MG/1
25 CAPSULE, GELATIN COATED ORAL EVERY 8 HOURS SCHEDULED
Status: DISCONTINUED | OUTPATIENT
Start: 2018-02-07 | End: 2018-02-10 | Stop reason: HOSPADM

## 2018-02-07 RX ADMIN — POTASSIUM CHLORIDE 40 MEQ: 1500 TABLET, EXTENDED RELEASE ORAL at 08:09

## 2018-02-07 RX ADMIN — OSELTAMIVIR PHOSPHATE 75 MG: 75 CAPSULE ORAL at 08:09

## 2018-02-07 RX ADMIN — Medication 100 MG: at 08:09

## 2018-02-07 RX ADMIN — CHLORDIAZEPOXIDE HYDROCHLORIDE 25 MG: 25 CAPSULE ORAL at 13:48

## 2018-02-07 RX ADMIN — POTASSIUM CHLORIDE 40 MEQ: 1500 TABLET, EXTENDED RELEASE ORAL at 17:11

## 2018-02-07 RX ADMIN — FOLIC ACID 1 MG: 1 TABLET ORAL at 08:09

## 2018-02-07 RX ADMIN — CHLORDIAZEPOXIDE HYDROCHLORIDE 25 MG: 25 CAPSULE ORAL at 08:09

## 2018-02-07 RX ADMIN — CHLORDIAZEPOXIDE HYDROCHLORIDE 25 MG: 25 CAPSULE ORAL at 21:07

## 2018-02-07 RX ADMIN — OSELTAMIVIR PHOSPHATE 75 MG: 75 CAPSULE ORAL at 21:08

## 2018-02-07 RX ADMIN — SENNOSIDES A AND B 8.8 MG: 415.36 LIQUID ORAL at 08:09

## 2018-02-07 RX ADMIN — ENOXAPARIN SODIUM 40 MG: 40 INJECTION SUBCUTANEOUS at 08:09

## 2018-02-07 NOTE — SOCIAL WORK
Cm spoke with Sandoval Mccracken from [place] [address] [phone] or [phone] who spoke with [facility]    stated the best option for patient is to find him an inpatient rehab facility that is covered under his insurance  Cm to speak with Sandoval Mccracken in AM to clarify patient's primary insurance (she stated it was Yvan Litten but patient's chart shows that his primary is [employer])  Sandoval Mccracken also stated that EAP only suggests which facilities patient should look into and provides guidance for patient  Sourav Mclean [phone], is to be contacted once patient has accepting rehab facility who will then follow to assess patient's fit for duty to return to work  Cm to update Dr Harmon Browilder once accepting rehab facility is known  Cm continues to work on patient's discharge plan

## 2018-02-07 NOTE — SOCIAL WORK
MCG Guide Used for Initial Round: substance abuse   Optimal GLOS: 3  Hospital Day: 4 days  · DC Readiness: Discharge readiness is indicated by patient meeting Recovery Milestones, including ALL of the following:  ? No dangerous behavior[Y] for at least 24 hours  ? Thoughts of suicide or Harm absent or manageable at lower level of care  ? Patient and supports understand follow-up treatment and crisis plan  ? Provider and supports sufficiently available at lower level of care  ? Patient can participate (eg, verify absence of plan for harm) in needed monitoring    Identified Barriers: ivf, electrolytes,  Withdrawal symptoms     Discussion Date (Time): 02/07/18 with Dr Yaneth Braun

## 2018-02-07 NOTE — SOCIAL WORK
Cm spoke with Oleg Bright from [address] at [facility], where pt works  Oleg Bright to reach out to [employer] to figure out why patient is not open to them  Hawa to call Cm back  Oleg Bright stated HR was assisting patient with applying for short-term disability PTA and is now trying to assist patient's mom, [employer] [phone], with applying for short-term disability for patient  Oleg Bright asked if SL would be able to assist patient's mom in applying on behalf of patient  Cm reached out to [facility] and provided her with information  Cm awaiting return call from [facility] regarding this  Cm continues to follow  Cm spoke with Alessia Christie from [address]   Cm discussed HOST assessment recommendation for Intensive Outpatient treatment  Cm explained patient is in agreement with going to inpatient rehab in order to return to work  HOST Program to follow patient and assist with inpatient rehab placement    Cm to follow up with HOST Program

## 2018-02-07 NOTE — SOCIAL WORK
Cm received recommendation from HOST Program, who recommended Intensive Outpatient  Patient is in agreement with inpatient treatment though  Cm is awaiting determination from [facility] to see if they are able to accept patient for inpatient rehab treatment  Cm contacted Dr Markus oCdy [phone] to update her on patient's plan of care  Dr Markus Cody to be informed once [facility] provides Cm with determination  Cm also contacted Eliceo Berrycheryl [phone] to provide her with an update  Willie Gayle to inform MISBAH Shaikh of update as well  Cm received contact number for [employer], [phone]  Cm spoke with Thomas Pagan about patient, who is a [address] [facility]  Thomas Pagan stated patient is not currently in their system and advised Cm to call patient's employer to follow up with them  Cm called Xochitl Ro with HR at [phone] and left voicemail to explain what Thomas Pagan had told Cm  Cm awaiting return phone call from [facility], [facility] regarding patient's plan of care and [employer]  Cm continues to work on patient's discharge plan

## 2018-02-07 NOTE — SOCIAL WORK
Cm reviewed patient during care coordination rounds  Pt not medically stable for discharge today  Pt anticipated for discharge tomorrow  HOST Program requesting patient's facesheet, H&P, and med list   Chris faxed information to HOST at [phone]  Cm met with patient to confirm he was in agreement with referral to [facility] for inpatient rehab treatment  Pt stated he was still in agreement  Cm spoke with Camille Mackey from [address]  Cm faxed paperwork to [facility], per Claudio's request, [phone]  Chris also spoke with Bernardo Lo in admissions, who stated that [facility] usually submits for insurance authorization if patient is accepted to facility  Awaiting determination  Cm continues to follow and work on patient's discharge plan  Pt reported to Cm that he was feeling depressed and asked for medication to help with this  Cm relayed this information to medical team   Medical team met with patient to discuss patient's depression  Cm following

## 2018-02-07 NOTE — PROGRESS NOTES
[facility] Internal Medicine Progress Note  Patient: Kate Suarez 63 y o  male   MRN: [id number]  PCP: Chi Barnes DO  Unit/Bed#: [facility] 834-01 Encounter: [id number]  Date Of Visit: 02/07/18    Assessment:    Principal Problem:    Alcohol withdrawal syndrome without complication ([facility] )  Active Problems:    Hypokalemia    Elevated liver enzymes    Major depressive disorder    Viral sepsis ([facility] )      Plan:    · Influenxa A Infection with Sepsis POA -   · Tamiflu will be continued through 2/9/2018  · Supportive care - monitor for symptoms  · Oxygen - 93-98% on room air  · Monitor temps and Counts  · Alchohol Dependence / Abuse with Withdrawal -   · Librium 25 mg every 6 hours, will transition to q8 hours today  Wean slowly as tolerated  · Thiamine 046 mg daily and Folic acid 1 mg daily  · Ativan PRN for withdrawal   · Serial Exams  · Patient   · Major Depression - Not currently on any medication for this  · Elevated Transaminases - likely due to alcohol abuse  Stable / improved  · Hypokalemia - resolved  Monitor  VTE Pharmacologic Prophylaxis:   Pharmacologic: Enoxaparin (Lovenox)  Mechanical VTE Prophylaxis in Place: Yes    Patient Centered Rounds: I have performed bedside rounds with nursing staff today  Discussions with Specialists or Other Care Team Provider: None    Education and Discussions with Family / Patient: Patient only  Time Spent for Care: 30 minutes  More than 50% of total time spent on counseling and coordination of care as described above  Current Length of Stay: 4 day(s)    Current Patient Status: Inpatient   Certification Statement: The patient will continue to require additional inpatient hospital stay due to evaluation and treatment for influenza but also for mental status  Discharge Plan / Estimated Discharge Date: Case management working on drug / alcohol rehab program and need to make sure patient otherwise stable  Next 24 - 48 hours?     Code Status: Level 1 - Full Code      Subjective: The patient has no acute complaints today  No dyspnea, cough, or wheezing  He is asking about returning to work  The patient has no nausea  He is doing ok from a mental status points with no severe confusion, though he is still somewhat wifty at times  The patient denies palpitations, anxious feelings, and tremors  He was more depressed earlier, but after talking with medical student, felt better  Objective:     Vitals:   Temp (24hrs), Av 9 °F (36 6 °C), Min:97 7 °F (36 5 °C), Max:98 °F (36 7 °C)    HR:  [72-76] 76  Resp:  [16-18] 18  BP: (139-150)/(80-95) 145/95  SpO2:  [97 %-99 %] 97 %  Body mass index is 23 57 kg/m²  Input and Output Summary (last 24 hours): Intake/Output Summary (Last 24 hours) at 18 1733  Last data filed at 18 1545   Gross per 24 hour   Intake             2615 ml   Output              475 ml   Net             2140 ml       Physical Exam:     Physical Exam   Constitutional: No distress  HENT:   Mouth/Throat: Oropharynx is clear and moist    Eyes: Conjunctivae are normal  Pupils are equal, round, and reactive to light  Neck: Neck supple  Cardiovascular: Normal rate and regular rhythm  No murmur heard  Pulmonary/Chest: Effort normal  He has no wheezes  He has no rales  Slightly decreased breath sounds  Abdominal: Soft  Bowel sounds are normal  He exhibits no distension  There is no tenderness  Musculoskeletal: He exhibits no edema or tenderness  Neurological: He is alert  No cranial nerve deficit  He exhibits normal muscle tone  Generally, orientation unchanged from yesterday  He follows most parts of conversation  Psychiatric: He has a normal mood and affect  Nursing note and vitals reviewed      Additional Data:     Labs:      Results from last 7 days  Lab Units 18  0432   WBC Thousand/uL 8 84   HEMOGLOBIN g/dL 13 9   HEMATOCRIT % 40 8   PLATELETS Thousands/uL 146*   NEUTROS PCT % 56   LYMPHS PCT % 23 MONOS PCT % 17*   EOS PCT % 3       Results from last 7 days  Lab Units 02/07/18  0435   SODIUM mmol/L 141   POTASSIUM mmol/L 4 1   CHLORIDE mmol/L 112*   CO2 mmol/L 23   BUN mg/dL 11   CREATININE mg/dL 0 70   CALCIUM mg/dL 7 9*   TOTAL PROTEIN g/dL 6 1*   BILIRUBIN TOTAL mg/dL 0 61   ALK PHOS U/L 63   ALT U/L 49   AST U/L 72*   GLUCOSE RANDOM mg/dL 79           * I Have Reviewed All Lab Data Listed Above  * Additional Pertinent Lab Tests Reviewed: All Labs Within Last 24 Hours Reviewed    Imaging:    Imaging Reports Reviewed Today Include: No new imaging to review  Imaging Personally Reviewed by Myself Includes:  None    Recent Cultures (last 7 days):       Results from last 7 days  Lab Units 02/04/18 2017 02/04/18  1714 02/04/18  1708   BLOOD CULTURE   --  No Growth at 48 hrs  No Growth at 48 hrs  INFLUENZA A PCR  Detected*  --   --    INFLUENZA B PCR  None Detected  --   --    RSV PCR  None Detected  --   --        Last 24 Hours Medication List:     Current Facility-Administered Medications:  acetaminophen 650 mg Oral Q6H PRN Garfield Castanon MD   chlordiazePOXIDE 25 mg Oral Q6H [address] Guille Justice MD   diphenhydrAMINE 25 mg Intravenous Q6H PRN BRY Herrera   enoxaparin 40 mg Subcutaneous Q24H [address] Aaron Somers DO   folic acid 1 mg Oral Daily Erick Sutherland MD   LORazepam 1 mg Intravenous Q6H PRN Guille Justice MD   metoprolol 5 mg Intravenous Q6H PRN Clista Gustavo Sutherland MD   ondansetron 4 mg Intravenous Q6H PRN Garfield Castanon MD   oseltamivir 75 mg Oral Q12H [id number] [address] Judit Santos MD   potassium chloride 40 mEq Oral BID Guille Justice MD   senna 8 8 mg Oral Daily Garfield Castanon MD   simethicone 80 mg Oral 4x Daily PRN Garfield Castanon MD   thiamine 100 mg Oral Daily BRY Escobar        Today, Patient Was Seen By: Hayde Coy DO    ** Please Note: This note has been constructed using a voice recognition system   **

## 2018-02-07 NOTE — PROGRESS NOTES
Progress Note - Infectious Disease   Celina Portillo 63 y o  male MRN: [id number]  Unit/Bed#: [facility] 834-01 Encounter: [id number]      Impression:  1  Influenza A  2  ETOH withdrawal    Recommendations:  1  Continue Tamiflu to complete 5 days  2  Discussed with case management  Patient is willing to enter a program for his ETOH issues  They will refer      Antibiotics:  1  Tamiflu, day 3 of 5    Subjective:  He is now afebrile and feeling well with only an occasional dry cough  Denies fevers, chills, or sweats  Denies nausea, vomiting, or diarrhea  Objective:  Vitals:  HR:  [72-76] 76  Resp:  [16-18] 18  BP: (139-150)/(80-95) 145/95  SpO2:  [97 %-99 %] 97 %  Temp (24hrs), Av 9 °F (36 6 °C), Min:97 7 °F (36 5 °C), Max:98 °F (36 7 °C)  Current: Temperature: 98 °F (36 7 °C)    Physical Exam:     General Appearance:  Alert, fatigued , nontoxic, no acute distress  Throat: Oropharynx moist without lesions  Lips, mucosa, and tongue normal   Neck: Supple, symmetrical, trachea midline, no adenopathy,  no tenderness/mass/nodules   Lungs:   Clear to auscultation bilaterally, no audible wheezes, rhonchi or rales; respirations unlabored   Heart:  Regular rate and rhythm, S1, S2 normal, no murmur, rub or gallop   Abdomen:   Soft, non-tender, non-distended, positive bowel sounds    No masses, no organomegaly    No CVA tenderness   Extremities: Extremities normal, atraumatic, no clubbing, cyanosis or edema, mild tremulousness has improved   Skin: Facial and upper body erythema is now less         Invasive Devices     Peripheral Intravenous Line            Peripheral IV 18 Left Forearm 3 days                Labs, Imaging, & Other studies:   All pertinent labs were personally reviewed    Results from last 7 days  Lab Units 18  0432 18  0436 18  1709   WBC Thousand/uL 8 84 8 48 7 09   HEMOGLOBIN g/dL 13 9 15 4 17 1*   PLATELETS Thousands/uL 146* 154 168       Results from last 7 days  Lab Units 02/07/18  0435 02/06/18  0432 02/05/18  0436 02/04/18  1947  02/02/18  1454   SODIUM mmol/L 141 142 137 136  < > 139   POTASSIUM mmol/L 4 1 3 6 3 0* 3 3*  < > 3 7   CHLORIDE mmol/L 112* 110* 105 104  < > 103   CO2 mmol/L 23 24 22 25  < > 24   ANION GAP mmol/L 6 8 10 7  < > 12   BUN mg/dL 11 15 9 10  < > 8   CREATININE mg/dL 0 70 0 85 0 75 0 80  < > 1 08   EGFR ml/min/1 73sq m 103 95 100 97  < > 74   GLUCOSE RANDOM mg/dL 79 84 91 88  < > 112   CALCIUM mg/dL 7 9* 7 7* 8 0* 8 3  < > 8 9   AST U/L 72*  --   --  104*  --  207*   ALT U/L 49  --   --  79*  --  130*   ALK PHOS U/L 63  --   --  84  --  110   TOTAL PROTEIN g/dL 6 1*  --   --  7 3  --  8 1   BILIRUBIN TOTAL mg/dL 0 61  --   --  1 18*  --  1 85*   < > = values in this interval not displayed  Results from last 7 days  Lab Units 02/04/18  2017 02/04/18  1714 02/04/18  1708   BLOOD CULTURE   --  No Growth at 48 hrs  No Growth at 48 hrs     INFLUENZA A PCR  Detected*  --   --    INFLUENZA B PCR  None Detected  --   --    RSV PCR  None Detected  --   --

## 2018-02-07 NOTE — MEDICAL STUDENT
MEDICAL STUDENT  Inpatient Progress Note for TRAINING ONLY  Not Part of Legal Medical Record       Progress Note - Andrés Alisa 63 y o  male MRN: [id number]    Unit/Bed#: [facility] 834-01 Encounter: [id number]      Assessment/Plan:  1  Alcohol withdrawal   -Patient alert and oriented x4; he denies hallucinations, anxiety, agitation, headaches or tremors    -Pt on Librium 25mg oral Q6H; will continue taper and switch to Q8H     -Thiamine and folate supplementation  -PRN Ativan and Zofran for sx's   2  Influenza  -Positive for Influenza A  -Afebrile >24 hrs     -Started on Tamiflu (on day 3/5)   -ID following    3  Elevated liver enzymes  -Improved   -F/u with GI outpatient    4  Hypokalemia   -Resolved; K+ 4 1 today  5  History  of SI   -Police were sent to patient's house last week after he told friends he had suicidal thoughts of shooting himself; pt told police he was just angry and does not have a gun  -Pt was evaluated by psychiatry; he denies SI or HI       Disposition: CM continues to work on patient's discharge plan to inpatient alcohol rehab          Subjective: This morning Mr Griselda Smith reports a mild dry cough but otherwise has no symptoms; he denies chills, chest pain, SOB, nausea/vomiting, diarrhea, abdominal pain, urinary sx's, anxiety or hallucinations  He continues to be amenable to treatment for alcohol abuse  Objective:     Vitals: Blood pressure 139/93, pulse 72, temperature 97 7 °F (36 5 °C), temperature source Oral, resp  rate 18, height 6' 1 5" (1 867 m), weight 82 1 kg (181 lb 1 6 oz), SpO2 99 %  ,Body mass index is 23 57 kg/m²        Intake/Output Summary (Last 24 hours) at 02/07/18 0954  Last data filed at 02/07/18 0408   Gross per 24 hour   Intake             2226 ml   Output              725 ml   Net             1501 ml       Physical Exam: General appearance: alert and oriented, in no acute distress  Head: Normocephalic, without obvious abnormality, atraumatic  Neck: no JVD and supple, symmetrical, trachea midline  Lungs: clear to auscultation bilaterally  Heart: regular rate and rhythm  Abdomen: Soft, non-tender  Extremities: No lower extremity edema   Neurologic: Mental status: Alert, oriented, thought content appropriate     Invasive Devices     Peripheral Intravenous Line            Peripheral IV 02/03/18 Left Forearm 3 days                Lab, Imaging and other studies: I have personally reviewed pertinent reports      VTE Pharmacologic Prophylaxis: Enoxaparin (Lovenox)  VTE Mechanical Prophylaxis: sequential compression device

## 2018-02-08 PROCEDURE — 99232 SBSQ HOSP IP/OBS MODERATE 35: CPT | Performed by: INTERNAL MEDICINE

## 2018-02-08 RX ADMIN — FOLIC ACID 1 MG: 1 TABLET ORAL at 08:39

## 2018-02-08 RX ADMIN — CHLORDIAZEPOXIDE HYDROCHLORIDE 25 MG: 25 CAPSULE ORAL at 21:25

## 2018-02-08 RX ADMIN — POTASSIUM CHLORIDE 40 MEQ: 1500 TABLET, EXTENDED RELEASE ORAL at 18:21

## 2018-02-08 RX ADMIN — OSELTAMIVIR PHOSPHATE 75 MG: 75 CAPSULE ORAL at 08:39

## 2018-02-08 RX ADMIN — OSELTAMIVIR PHOSPHATE 75 MG: 75 CAPSULE ORAL at 21:25

## 2018-02-08 RX ADMIN — CHLORDIAZEPOXIDE HYDROCHLORIDE 25 MG: 25 CAPSULE ORAL at 14:49

## 2018-02-08 RX ADMIN — SENNOSIDES A AND B 8.8 MG: 415.36 LIQUID ORAL at 08:39

## 2018-02-08 RX ADMIN — ENOXAPARIN SODIUM 40 MG: 40 INJECTION SUBCUTANEOUS at 08:39

## 2018-02-08 RX ADMIN — CHLORDIAZEPOXIDE HYDROCHLORIDE 25 MG: 25 CAPSULE ORAL at 05:28

## 2018-02-08 RX ADMIN — Medication 100 MG: at 08:39

## 2018-02-08 RX ADMIN — POTASSIUM CHLORIDE 40 MEQ: 1500 TABLET, EXTENDED RELEASE ORAL at 08:38

## 2018-02-08 NOTE — PROGRESS NOTES
[facility] Internal Medicine Progress Note  Patient: Lorin Spring 63 y o  male   MRN: [id number]  PCP: Allegra Ugarte DO  Unit/Bed#: [facility] 834-01 Encounter: [id number]  Date Of Visit: 02/08/18    Assessment:    Principal Problem:    Alcohol withdrawal syndrome without complication ([facility] )  Active Problems:    Hypokalemia    Elevated liver enzymes    Major depressive disorder    Viral sepsis ([facility] )      Plan:    · Influenxa A Infection with Sepsis POA -   · Tamiflu will be continued through 2/9/2018  · Supportive care - monitor for symptoms  · Oxygen - 93-98% on room air  · Monitor temps and Counts  · Alchohol Dependence / Abuse with Withdrawal -   · Librium 25 mg every 8 hours, with next taper to bid not expected until 2/13 or 2/14/2018  Wean slowly as tolerated  · Thiamine 937 mg daily and Folic acid 1 mg daily  · Ativan PRN for withdrawal   · Serial Exams  · Patient will require inpatient drug / alcohol rehab in order to return to work eventually, so we are working with case management to get this set up  · Major Depression - Not currently on any medication for this  · Elevated Transaminases - likely due to alcohol abuse  Stable / improved  · Hypokalemia - resolved  Monitor  VTE Pharmacologic Prophylaxis:   Pharmacologic: Enoxaparin (Lovenox)  Mechanical VTE Prophylaxis in Place: Yes    Patient Centered Rounds: I have performed bedside rounds with nursing staff today  Discussions with Specialists or Other Care Team Provider: None    Education and Discussions with Family / Patient: will update family a little later (number is home phone and they just left recently I am told)  Time Spent for Care: 30 minutes  More than 50% of total time spent on counseling and coordination of care as described above      Current Length of Stay: 5 day(s)    Current Patient Status: Inpatient   Certification Statement: The patient will continue to require additional inpatient hospital stay due to evaluation and treatment for influenza but also for mental status as well as obtaining inpatient drug / alcohol rehab  Discharge Plan / Estimated Discharge Date: Case management working on drug / alcohol rehab program and need to make sure patient otherwise stable  Possible discharge tomorrow  Code Status: Level 1 - Full Code      Subjective: The patient has no acute complaints  He is still wifty with mentation and could not tell me where he is  He is able to tell me president, year, and month  He has no nausea, headache, dizziness, or dyspnea  Objective:     Vitals:   Temp (24hrs), Av °F (36 7 °C), Min:97 6 °F (36 4 °C), Max:98 4 °F (36 9 °C)    HR:  [70-73] 70  Resp:  [18] 18  BP: (110-130)/(75-77) 130/77  SpO2:  [97 %-99 %] 97 %  Body mass index is 23 57 kg/m²  Input and Output Summary (last 24 hours): Intake/Output Summary (Last 24 hours) at 18 1508  Last data filed at 18 1358   Gross per 24 hour   Intake              916 ml   Output                0 ml   Net              916 ml       Physical Exam:     Physical Exam   Constitutional: No distress  HENT:   Mouth/Throat: Oropharynx is clear and moist    Eyes: Conjunctivae are normal  Pupils are equal, round, and reactive to light  Cardiovascular: Normal rate and regular rhythm  No murmur heard  Pulmonary/Chest: Effort normal  He has no wheezes  He has no rales  Slightly decreased breath sounds  Abdominal: Soft  Bowel sounds are normal  He exhibits no distension  There is no tenderness  Musculoskeletal: He exhibits no edema or tenderness  Neurological: He is alert  No cranial nerve deficit  He exhibits normal muscle tone  See subjective portion of this note  The patient is able to tell me some items from past history (long term memory) and short term events but sometimes needs reference points to jog his memory  Psychiatric: He has a normal mood and affect  Nursing note and vitals reviewed      Additional Data: Labs:      Results from last 7 days  Lab Units 02/06/18  0432   WBC Thousand/uL 8 84   HEMOGLOBIN g/dL 13 9   HEMATOCRIT % 40 8   PLATELETS Thousands/uL 146*   NEUTROS PCT % 56   LYMPHS PCT % 23   MONOS PCT % 17*   EOS PCT % 3       Results from last 7 days  Lab Units 02/07/18  0435   SODIUM mmol/L 141   POTASSIUM mmol/L 4 1   CHLORIDE mmol/L 112*   CO2 mmol/L 23   BUN mg/dL 11   CREATININE mg/dL 0 70   CALCIUM mg/dL 7 9*   TOTAL PROTEIN g/dL 6 1*   BILIRUBIN TOTAL mg/dL 0 61   ALK PHOS U/L 63   ALT U/L 49   AST U/L 72*   GLUCOSE RANDOM mg/dL 79           * I Have Reviewed All Lab Data Listed Above  * Additional Pertinent Lab Tests Reviewed: No New Labs Available For Today    Imaging:    Imaging Reports Reviewed Today Include: No new imaging to review  Imaging Personally Reviewed by Myself Includes:  None    Recent Cultures (last 7 days):       Results from last 7 days  Lab Units 02/04/18 2017 02/04/18  1714 02/04/18  1708   BLOOD CULTURE   --  No Growth at 72 hrs  No Growth at 72 hrs     INFLUENZA A PCR  Detected*  --   --    INFLUENZA B PCR  None Detected  --   --    RSV PCR  None Detected  --   --        Last 24 Hours Medication List:     Current Facility-Administered Medications:  acetaminophen 650 mg Oral Q6H PRN Dede Zuñiga MD   chlordiazePOXIDE 25 mg Oral [facility] Aaron Somers DO   diphenhydrAMINE 25 mg Intravenous Q6H PRN BRY Stanley   enoxaparin 40 mg Subcutaneous Q24H [address] Aaron Somers DO   folic acid 1 mg Oral Daily Erick Sutherland MD   LORazepam 1 mg Intravenous Q6H PRN Kenya Hansen MD   metoprolol 5 mg Intravenous Q6H PRN Betty Sutherland MD   ondansetron 4 mg Intravenous Q6H PRN Dede Zuñiga MD   oseltamivir 75 mg Oral Q12H [address] Kenya Hansen MD   potassium chloride 40 mEq Oral BID Kenya Hansen MD   senna 8 8 mg Oral Daily Dede Zuñiga MD   simethicone 80 mg Oral 4x Daily PRN Dede Zuñiga MD   thiamine 100 mg Oral Daily VioletaBRY Kong Today, Patient Was Seen By: Mary Proctor DO    ** Please Note: This note has been constructed using a voice recognition system   **

## 2018-02-08 NOTE — SOCIAL WORK
Cm reviewed patient during care coordination rounds  [facility] is reviewing and is to inform Cm of determination  Cm provided Bianca Cpaps from [facility] with case management contact information for later this evening  Patient's last dose of tamiflu is scheduled to be given tomorrow, 2/9  Cm provided pt & pt's family w/ contact info for all parties involved in pt's plan of care (Molina Desir [phone], [facility] [phone], Fit for Duty office @ [facility] [phone], & Dr Earl Coates for fitness evaluation [phone])  Cm discussed transportation with family and patient  Pt's family stated they would not be able to afford [facility] transportation cost   Family to transport at time of discharge  Cm to contact patient's family once facility makes determination Ewa Smith, pt's mom [phone] and Reena Rincon, pt's nephew [phone])  Cm continues to follow and work on patient's discharge plan

## 2018-02-08 NOTE — PROGRESS NOTES
Progress Note - Infectious Disease   Orestes Rodrigez 63 y o  male MRN: [id number]  Unit/Bed#: [facility] 834-01 Encounter: [id number]      Impression:  1  Influenza A  2  ETOH withdrawal    Recommendations:  1  Continue Tamiflu to complete 5 days  2  Discussed with case management  Patient is willing to enter a program for his ETOH issues  [employer] evaluation pending      Antibiotics:  1  Tamiflu, day 4 of 5    Subjective:  Still has dry cough  Denies fevers, chills, or sweats  Denies nausea, vomiting, or diarrhea  Objective:  Vitals:  HR:  [70-78] 78  Resp:  [18] 18  BP: (110-130)/(62-77) 110/62  SpO2:  [95 %-99 %] 95 %  Temp (24hrs), Av 1 °F (36 7 °C), Min:97 6 °F (36 4 °C), Max:98 4 °F (36 9 °C)  Current: Temperature: 98 2 °F (36 8 °C)    Physical Exam:     General Appearance:  Alert, fatigued , nontoxic, no acute distress  Throat: Oropharynx moist without lesions  Lips, mucosa, and tongue normal   Neck: Supple, symmetrical, trachea midline, no adenopathy,  no tenderness/mass/nodules   Lungs:   Clear to auscultation bilaterally, no audible wheezes, rhonchi or rales; respirations unlabored   Heart:  Regular rate and rhythm, S1, S2 normal, no murmur, rub or gallop   Abdomen:   Soft, non-tender, non-distended, positive bowel sounds    No masses, no organomegaly    No CVA tenderness   Extremities: Extremities normal, atraumatic, no clubbing, cyanosis or edema, mild tremulousness has improved   Skin: Facial erythema increased today         Invasive Devices          No matching active lines, drains, or airways          Labs, Imaging, & Other studies:   All pertinent labs were personally reviewed    Results from last 7 days  Lab Units 18  0432 18  0436 18  1709   WBC Thousand/uL 8 84 8 48 7 09   HEMOGLOBIN g/dL 13 9 15 4 17 1*   PLATELETS Thousands/uL 146* 154 168       Results from last 7 days  Lab Units 18  0435 18  0432 18  0436 18  1947 02/02/18  1454   SODIUM mmol/L 141 142 137 136  < > 139   POTASSIUM mmol/L 4 1 3 6 3 0* 3 3*  < > 3 7   CHLORIDE mmol/L 112* 110* 105 104  < > 103   CO2 mmol/L 23 24 22 25  < > 24   ANION GAP mmol/L 6 8 10 7  < > 12   BUN mg/dL 11 15 9 10  < > 8   CREATININE mg/dL 0 70 0 85 0 75 0 80  < > 1 08   EGFR ml/min/1 73sq m 103 95 100 97  < > 74   GLUCOSE RANDOM mg/dL 79 84 91 88  < > 112   CALCIUM mg/dL 7 9* 7 7* 8 0* 8 3  < > 8 9   AST U/L 72*  --   --  104*  --  207*   ALT U/L 49  --   --  79*  --  130*   ALK PHOS U/L 63  --   --  84  --  110   TOTAL PROTEIN g/dL 6 1*  --   --  7 3  --  8 1   BILIRUBIN TOTAL mg/dL 0 61  --   --  1 18*  --  1 85*   < > = values in this interval not displayed  Results from last 7 days  Lab Units 02/04/18 2017 02/04/18  1714 02/04/18  1708   BLOOD CULTURE   --  No Growth at 72 hrs  No Growth at 72 hrs     INFLUENZA A PCR  Detected*  --   --    INFLUENZA B PCR  None Detected  --   --    RSV PCR  None Detected  --   --

## 2018-02-08 NOTE — PROGRESS NOTES
Patient took it upon himself to pull out his IV because he "was tired of having it in"  Patients scheduled medications are all PO and IV PRN medications have not been administered since Sunday  Called [facility] and spoke with [facility], ELANA to see IV could be left out  IV is ok to left out; nursing communication will be put in

## 2018-02-08 NOTE — CASE MANAGEMENT
Thank you,  [facility] in the [facility] by Tim Shukla for 2017  Network Utilization Review Department  Phone: [phone]; Fax [phone]  ATTENTION: The Network Utilization Review Department is now centralized for our 7 Facilities  Make a note that we have a new phone and fax numbers for our Department  Please call with any questions or concerns to [phone] and carefully follow the prompts so that you are directed to the right person  All voicemails are confidential  Fax any determinations, approvals, denials, and requests for initial or continue stay review clinical to [phone]  Due to HIGH CALL volume, it would be easier if you could please send faxed requests to expedite your requests and in part, help us provide discharge notifications faster      Continued Stay Review    Date: 2/8/18    Vital Signs: /77 (BP Location: Left arm)   Pulse 70   Temp 97 6 °F (36 4 °C) (Oral)   Resp 18   Ht 6' 1 5" (1 867 m)   Wt 82 1 kg (181 lb 1 6 oz)   SpO2 97%   BMI 23 57 kg/m²     Medications:   Scheduled Meds:   Current Facility-Administered Medications:  acetaminophen 650 mg Oral Q6H PRN Dora Frias MD   chlordiazePOXIDE 25 mg Oral [facility] Aaron Somers,    diphenhydrAMINE 25 mg Intravenous Q6H PRN BRY Jaime   enoxaparin 40 mg Subcutaneous Q24H [address] Aaron Somers,    folic acid 1 mg Oral Daily Erick Sutherland MD   LORazepam 1 mg Intravenous Q6H PRN Sunil Salomon MD   metoprolol 5 mg Intravenous Q6H PRN Jeff Sutherland MD   ondansetron 4 mg Intravenous Q6H PRN Dora Frias MD   oseltamivir 75 mg Oral Q12H [address] [facility] [address] [place] Joselo Hamilton MD   potassium chloride 40 mEq Oral BID Sunil Salomon MD   senna 8 8 mg Oral Daily Dora Frias MD   simethicone 80 mg Oral 4x Daily PRN Dora Frias MD   thiamine 100 mg Oral Daily BRY Vernon     Continuous Infusions:    PRN Meds:   acetaminophen   diphenhydrAMINE    LORazepam    metoprolol    ondansetron    simethicone    Age/Sex: 63 y o  male     Assessment/Plan:   2/8 Medicine Progress Note  Principal Problem:    Alcohol withdrawal syndrome without complication (HCC)  Active Problems:    Hypokalemia    Elevated liver enzymes    Major depressive disorder    Viral sepsis ([facility] [address] )      Plan:     · Influenxa A Infection with Sepsis POA -   ? Tamiflu will be continued through 2/9/2018   ? Supportive care - monitor for symptoms  ? Oxygen - 93-98% on room air  ? Monitor temps and Counts  · Alchohol Dependence / Abuse with Withdrawal -   ? Librium 25 mg every 6 hours, will transition to q8 hours today  Wean slowly as tolerated  ? Thiamine 339 mg daily and Folic acid 1 mg daily  ? Ativan PRN for withdrawal   ? Serial Exams  ? Patient   · Major Depression - Not currently on any medication for this  · Elevated Transaminases - likely due to alcohol abuse  Stable / improved  · Hypokalemia - resolved  Monitor       VTE Pharmacologic Prophylaxis:   Pharmacologic: Enoxaparin (Lovenox)  Mechanical VTE Prophylaxis in Place: Yes  Current Patient Status: Inpatient   Certification Statement: The patient will continue to require additional inpatient hospital stay due to evaluation and treatment for influenza but also for mental status  ______________________  2/8 ID Progress Note  Impression:  1  Influenza A  2  ETOH withdrawal     Recommendations:  1  Continue Tamiflu to complete 5 days  2  Discussed with case management  Patient is willing to enter a program for his ETOH issues  They will refer    Discharge Plan: Case management working on drug / alcohol rehab program and need to make sure patient otherwise stable  Next 24 - 48 hours?

## 2018-02-09 LAB
BACTERIA BLD CULT: NORMAL
BACTERIA BLD CULT: NORMAL

## 2018-02-09 PROCEDURE — 99232 SBSQ HOSP IP/OBS MODERATE 35: CPT | Performed by: INTERNAL MEDICINE

## 2018-02-09 RX ADMIN — POTASSIUM CHLORIDE 40 MEQ: 1500 TABLET, EXTENDED RELEASE ORAL at 08:12

## 2018-02-09 RX ADMIN — OSELTAMIVIR PHOSPHATE 75 MG: 75 CAPSULE ORAL at 23:57

## 2018-02-09 RX ADMIN — ENOXAPARIN SODIUM 40 MG: 40 INJECTION SUBCUTANEOUS at 08:13

## 2018-02-09 RX ADMIN — FOLIC ACID 1 MG: 1 TABLET ORAL at 08:12

## 2018-02-09 RX ADMIN — Medication 100 MG: at 08:12

## 2018-02-09 RX ADMIN — CHLORDIAZEPOXIDE HYDROCHLORIDE 25 MG: 25 CAPSULE ORAL at 05:51

## 2018-02-09 RX ADMIN — POTASSIUM CHLORIDE 40 MEQ: 1500 TABLET, EXTENDED RELEASE ORAL at 17:38

## 2018-02-09 RX ADMIN — CHLORDIAZEPOXIDE HYDROCHLORIDE 25 MG: 25 CAPSULE ORAL at 23:57

## 2018-02-09 RX ADMIN — SENNOSIDES A AND B 8.8 MG: 415.36 LIQUID ORAL at 08:12

## 2018-02-09 RX ADMIN — OSELTAMIVIR PHOSPHATE 75 MG: 75 CAPSULE ORAL at 08:13

## 2018-02-09 RX ADMIN — CHLORDIAZEPOXIDE HYDROCHLORIDE 25 MG: 25 CAPSULE ORAL at 14:58

## 2018-02-09 NOTE — PROGRESS NOTES
Progress Note - Infectious Disease   Cirilo Roland 63 y o  male MRN: [id number]  Unit/Bed#: [facility] 834-01 Encounter: [id number]      Impression:  1  Influenza A  2  ETOH withdrawal    Recommendations:  1  Continue Tamiflu to complete 5 days  today  2  Discussed with case management  Patient is willing to enter a program for his ETOH issues  [employer] has tentative acceptance this weekend  Antibiotics:  1  Tamiflu, day 5 of 5    Subjective:  Complains of a persistent dry cough  Denies fevers, chills, or sweats  Denies nausea, vomiting, or diarrhea  Objective:  Vitals:  HR:  [52-78] 74  Resp:  [18] 18  BP: ()/(61-79) 99/62  SpO2:  [95 %-99 %] 99 %  Temp (24hrs), Av °F (36 7 °C), Min:97 7 °F (36 5 °C), Max:98 2 °F (36 8 °C)  Current: Temperature: 97 7 °F (36 5 °C)    Physical Exam:     General Appearance:  Alert, fatigued , nontoxic, no acute distress  Throat: Oropharynx moist without lesions  Lips, mucosa, and tongue normal   Neck: Supple, symmetrical, trachea midline, no adenopathy,  no tenderness/mass/nodules   Lungs:   Clear to auscultation bilaterally, no audible wheezes, rhonchi or rales; respirations unlabored   Heart:  Regular rate and rhythm, S1, S2 normal, no murmur, rub or gallop   Abdomen:   Soft, non-tender, non-distended, positive bowel sounds    No masses, no organomegaly    No CVA tenderness   Extremities: Extremities normal, atraumatic, no clubbing, cyanosis or edema, mild tremulousness has improved   Skin: Facial erythema increased today         Invasive Devices          No matching active lines, drains, or airways          Labs, Imaging, & Other studies:   All pertinent labs were personally reviewed    Results from last 7 days  Lab Units 18  0432 18  0436 18  1709   WBC Thousand/uL 8 84 8 48 7 09   HEMOGLOBIN g/dL 13 9 15 4 17 1*   PLATELETS Thousands/uL 146* 154 168       Results from last 7 days  Lab Units 18  0435 18  043 02/05/18  0436 02/04/18  1947  02/02/18  1454   SODIUM mmol/L 141 142 137 136  < > 139   POTASSIUM mmol/L 4 1 3 6 3 0* 3 3*  < > 3 7   CHLORIDE mmol/L 112* 110* 105 104  < > 103   CO2 mmol/L 23 24 22 25  < > 24   ANION GAP mmol/L 6 8 10 7  < > 12   BUN mg/dL 11 15 9 10  < > 8   CREATININE mg/dL 0 70 0 85 0 75 0 80  < > 1 08   EGFR ml/min/1 73sq m 103 95 100 97  < > 74   GLUCOSE RANDOM mg/dL 79 84 91 88  < > 112   CALCIUM mg/dL 7 9* 7 7* 8 0* 8 3  < > 8 9   AST U/L 72*  --   --  104*  --  207*   ALT U/L 49  --   --  79*  --  130*   ALK PHOS U/L 63  --   --  84  --  110   TOTAL PROTEIN g/dL 6 1*  --   --  7 3  --  8 1   BILIRUBIN TOTAL mg/dL 0 61  --   --  1 18*  --  1 85*   < > = values in this interval not displayed  Results from last 7 days  Lab Units 02/04/18  2017 02/04/18  1714 02/04/18  1708   BLOOD CULTURE   --  No Growth After 4 Days  No Growth After 4 Days     INFLUENZA A PCR  Detected*  --   --    INFLUENZA B PCR  None Detected  --   --    RSV PCR  None Detected  --   --

## 2018-02-09 NOTE — SOCIAL WORK
Cm received voicemails from Dr Catarina Galeazzi and Israel Carter from Occupational Medicine office  Cm returned both calls and updated them on patient's plan of care  Hill Farmerfinkel stated that she would need patient to sign medical release form upon discharge  Cm to discuss this with patient  Cm called [facility] for update and spoke with Noa Hutchins, who stated they will return call shortly with determination  Cm awaiting return call  Cm continues to follow and work on discharge plan

## 2018-02-09 NOTE — PROGRESS NOTES
[facility] Internal Medicine Progress Note  Patient: Celina Portillo 63 y o  male   MRN: [id number]  PCP: Nate Dong DO  Unit/Bed#: [facility] 834-01 Encounter: [id number]  Date Of Visit: 02/09/18    Assessment:    Principal Problem:    Alcohol withdrawal syndrome without complication ([facility] )  Active Problems:    Hypokalemia    Elevated liver enzymes    Major depressive disorder    Viral sepsis ([facility] )      Plan:    · Influenxa A Infection with Sepsis POA -   · Tamiflu will be continued through 2/9/2018  · Supportive care - monitor for symptoms  · Oxygen - 93-98% on room air  · Monitor temps and Counts  · Alchohol Dependence / Abuse with Withdrawal -   · Librium 25 mg every 8 hours, with next taper to bid not expected until 2/13 or 2/14/2018  Wean slowly as tolerated  · Thiamine 692 mg daily and Folic acid 1 mg daily  · Ativan PRN for withdrawal   · Serial Exams  · Patient will require inpatient drug / alcohol rehab in order to return to work eventually, so we are working with case management to get this set up  · Major Depression - Not currently on any medication for this  Will ask pastoral care to see patient today  Will ask for psychiatry re-evaluation in am as this is identified as a trigger for why he drinks and should be managed to optimize success with alcohol rehab  · Elevated Transaminases - likely due to alcohol abuse  Stable / improved  · Hypokalemia - resolved  Monitor  VTE Pharmacologic Prophylaxis:   Pharmacologic: Enoxaparin (Lovenox)  Mechanical VTE Prophylaxis in Place: Yes    Patient Centered Rounds: I have performed bedside rounds with nursing staff today  Discussions with Specialists or Other Care Team Provider: None    Education and Discussions with Family / Patient: Patient only  Time Spent for Care: 30 minutes  More than 50% of total time spent on counseling and coordination of care as described above      Current Length of Stay: 6 day(s)    Current Patient Status: Inpatient   Certification Statement: The patient will continue to require additional inpatient hospital stay due to evaluation and treatment for influenza but also for mental status as well as obtaining inpatient drug / alcohol rehab  Discharge Plan / Estimated Discharge Date: Discharge tomorrow early afternoon  Code Status: Level 1 - Full Code      Subjective: The patient has no acute complaints  He is still wifty with mentation and could not tell me where he is  He is able to tell me president, year, and month  He has no nausea, headache, dizziness, or dyspnea  Objective:     Vitals:   Temp (24hrs), Av 7 °F (36 5 °C), Min:97 7 °F (36 5 °C), Max:97 7 °F (36 5 °C)    HR:  [52-75] 74  Resp:  [18] 18  BP: ()/(61-79) 99/62  SpO2:  [95 %-99 %] 99 %  Body mass index is 23 57 kg/m²  Input and Output Summary (last 24 hours): Intake/Output Summary (Last 24 hours) at 18 1643  Last data filed at 18 0947   Gross per 24 hour   Intake              780 ml   Output                0 ml   Net              780 ml       Physical Exam:     Physical Exam   Constitutional: No distress  HENT:   Mouth/Throat: Oropharynx is clear and moist    Cardiovascular: Normal rate and regular rhythm  Pulmonary/Chest: Effort normal  No respiratory distress  He has no wheezes  He has no rales  Abdominal: Soft  Bowel sounds are normal  He exhibits no distension  There is no tenderness  Musculoskeletal: He exhibits no edema or tenderness  Neurological: He is alert  No cranial nerve deficit  He exhibits normal muscle tone  Patient tells me that he is in hospital but [address] in [place]  The patient can tell me month and year  He gets more bogged down by depression  Psychiatric:   Patient is depressed and tearful at times  Nursing note and vitals reviewed      Additional Data:     Labs:      Results from last 7 days  Lab Units 18  0432   WBC Thousand/uL 8 84 HEMOGLOBIN g/dL 13 9   HEMATOCRIT % 40 8   PLATELETS Thousands/uL 146*   NEUTROS PCT % 56   LYMPHS PCT % 23   MONOS PCT % 17*   EOS PCT % 3       Results from last 7 days  Lab Units 02/07/18  0435   SODIUM mmol/L 141   POTASSIUM mmol/L 4 1   CHLORIDE mmol/L 112*   CO2 mmol/L 23   BUN mg/dL 11   CREATININE mg/dL 0 70   CALCIUM mg/dL 7 9*   TOTAL PROTEIN g/dL 6 1*   BILIRUBIN TOTAL mg/dL 0 61   ALK PHOS U/L 63   ALT U/L 49   AST U/L 72*   GLUCOSE RANDOM mg/dL 79           * I Have Reviewed All Lab Data Listed Above  * Additional Pertinent Lab Tests Reviewed: No New Labs Available For Today    Imaging:    Imaging Reports Reviewed Today Include: No new imaging to review  Imaging Personally Reviewed by Myself Includes:  None    Recent Cultures (last 7 days):       Results from last 7 days  Lab Units 02/04/18 2017 02/04/18  1714 02/04/18  1708   BLOOD CULTURE   --  No Growth After 4 Days  No Growth After 4 Days     INFLUENZA A PCR  Detected*  --   --    INFLUENZA B PCR  None Detected  --   --    RSV PCR  None Detected  --   --        Last 24 Hours Medication List:     Current Facility-Administered Medications:  acetaminophen 650 mg Oral Q6H PRN Raúl Muñoz MD   chlordiazePOXIDE 25 mg Oral [facility] Aaron Somers DO   diphenhydrAMINE 25 mg Intravenous Q6H PRN BRY Arroyo   enoxaparin 40 mg Subcutaneous Q24H [facility] Aaron Somers DO   folic acid 1 mg Oral Daily Erick Sutherland MD   LORazepam 1 mg Intravenous Q6H PRN Sivan Burgos MD   metoprolol 5 mg Intravenous Q6H PRN Buster Sutherland MD   ondansetron 4 mg Intravenous Q6H PRN Raúl Muñoz MD   oseltamivir 75 mg Oral Q12H [address] [facility] [address] [place] Gino Lara MD   potassium chloride 40 mEq Oral BID Sivan Burgos MD   senna 8 8 mg Oral Daily Raúl Muñoz MD   simethicone 80 mg Oral 4x Daily PRN Raúl Muñoz MD   thiamine 100 mg Oral Daily BRY Howard        Today, Patient Was Seen By: Kb Ewing DO    ** Please Note: This note has been constructed using a voice recognition system   **

## 2018-02-09 NOTE — SOCIAL WORK
Cm received call from [facility]  Pt is receiving his last dose of tamiflu today  [facility] stated they cannot accept patient until tamiflu is complete  Weekend Cm to [facility] when patient is medically clear for discharge  Cm informed patient and patient's mother  Family to transport tomorrow tomorrow if [facility] is able to accept  Cm continues to follow and work on patient's discharge plan

## 2018-02-10 VITALS
SYSTOLIC BLOOD PRESSURE: 114 MMHG | OXYGEN SATURATION: 99 % | DIASTOLIC BLOOD PRESSURE: 80 MMHG | WEIGHT: 181.1 LBS | TEMPERATURE: 97.5 F | HEART RATE: 79 BPM | RESPIRATION RATE: 16 BRPM | BODY MASS INDEX: 23.24 KG/M2 | HEIGHT: 74 IN

## 2018-02-10 PROCEDURE — 99239 HOSP IP/OBS DSCHRG MGMT >30: CPT | Performed by: INTERNAL MEDICINE

## 2018-02-10 RX ORDER — LANOLIN ALCOHOL/MO/W.PET/CERES
100 CREAM (GRAM) TOPICAL DAILY
Qty: 30 TABLET | Refills: 0 | Status: SHIPPED | OUTPATIENT
Start: 2018-02-11

## 2018-02-10 RX ORDER — SENNOSIDES 8.8 MG/5ML
8.8 LIQUID ORAL DAILY PRN
Qty: 236 ML | Refills: 0 | Status: SHIPPED | OUTPATIENT
Start: 2018-02-10

## 2018-02-10 RX ORDER — POTASSIUM CHLORIDE 20 MEQ/1
40 TABLET, EXTENDED RELEASE ORAL DAILY
Qty: 20 TABLET | Refills: 0 | Status: SHIPPED | OUTPATIENT
Start: 2018-02-10

## 2018-02-10 RX ORDER — CHLORDIAZEPOXIDE HYDROCHLORIDE 25 MG/1
CAPSULE, GELATIN COATED ORAL
Qty: 30 CAPSULE | Refills: 0 | Status: SHIPPED | OUTPATIENT
Start: 2018-02-10

## 2018-02-10 RX ORDER — ACETAMINOPHEN 325 MG/1
650 TABLET ORAL EVERY 6 HOURS PRN
Qty: 30 TABLET | Refills: 0 | Status: SHIPPED | OUTPATIENT
Start: 2018-02-10

## 2018-02-10 RX ORDER — FOLIC ACID 1 MG/1
1 TABLET ORAL DAILY
Qty: 30 TABLET | Refills: 0 | Status: SHIPPED | OUTPATIENT
Start: 2018-02-11

## 2018-02-10 RX ADMIN — POTASSIUM CHLORIDE 40 MEQ: 1500 TABLET, EXTENDED RELEASE ORAL at 08:04

## 2018-02-10 RX ADMIN — OSELTAMIVIR PHOSPHATE 75 MG: 75 CAPSULE ORAL at 08:04

## 2018-02-10 RX ADMIN — ENOXAPARIN SODIUM 40 MG: 40 INJECTION SUBCUTANEOUS at 08:04

## 2018-02-10 RX ADMIN — CHLORDIAZEPOXIDE HYDROCHLORIDE 25 MG: 25 CAPSULE ORAL at 13:52

## 2018-02-10 RX ADMIN — CHLORDIAZEPOXIDE HYDROCHLORIDE 25 MG: 25 CAPSULE ORAL at 06:03

## 2018-02-10 RX ADMIN — FOLIC ACID 1 MG: 1 TABLET ORAL at 08:04

## 2018-02-10 RX ADMIN — Medication 100 MG: at 08:04

## 2018-02-10 NOTE — DISCHARGE INSTRUCTIONS
Dear Jd Hatch,     It was our pleasure to care for you here at [facility]   It is our hope that we were always able to meet and exceed the expected standards for your care during your stay  You were hospitalized due to alcohol withdrawal   You were cared for on the 8th floor under the service of Mark James DO with the [facility] who covers for your primary care physician (PCP), Shala Pritchard DO, while you were hospitalized  If you have any questions or concerns related to this hospitalization, you may contact us at [phone]  For follow up, we recommend that you follow up with your primary care physician  Please review this entire discharge summary as additional general instructions may be provided later as well  However, at this time we provide for you here, the most important instructions / recommendations at discharge:     · Establish outpatient psychiatrist and psychotherapist to help with depression  · Continue taking Librium using the taper prescribed on her discharge medication list   · Please continue taking folic acid and thiamine  · Work to eliminate your alcohol consumption habits while at inpatient alcohol rehab facility  · Do this for you!       Sincerely,     Mark James DO

## 2018-02-10 NOTE — SOCIAL WORK
Per Annie Lopez at [facility] ([phone] ext  [phone]) patient's mother agreeable to cost  Transport arranged with [facility] for 2 pm p/u to facility  CM left voicemail for patient's mother Lauritamigue Godfreykin [phone] notifying her of transport time

## 2018-02-10 NOTE — NURSING NOTE
Pt being transferred to [facility] rehab; attempted to give report to [facility] staff;  gissel crum from [facility] states report "does not have to be given as dc paperwork has already been faxed"; report not given at this time

## 2018-02-10 NOTE — PLAN OF CARE
BEHAVIOR     Pt/Family maintain appropriate behavior and adhere to behavioral management agreement, if implemented Completed        Nicholes Million Will report anxiety at manageable levels Completed        Depression - IP adult     Effects of depression will be minimized Completed        DISCHARGE PLANNING - CARE MANAGEMENT     Discharge to post-acute care or home with appropriate resources Completed        Knowledge Deficit     Patient/family/caregiver demonstrates understanding of disease process, treatment plan, medications, and discharge instructions Completed        METABOLIC, FLUID AND ELECTROLYTES - ADULT     Electrolytes maintained within normal limits Completed     Fluid balance maintained Completed        NEUROSENSORY - ADULT     Achieves stable or improved neurological status Completed     Achieves maximal functionality and self care Completed        Potential for Falls     Patient will remain free of falls Completed        SAFETY ADULT     Patient will remain free of falls Completed     Maintain or return to baseline ADL function Completed     Maintain or return mobility status to optimal level Completed        SELF HARM     Effect of psychiatric condition will be minimized and patient will be protected from self harm Completed
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
## 2018-02-10 NOTE — SOCIAL WORK
CM followed up with [facility] regarding placement  Per [facility] a spot is available but patient will be responsible for deductable and co-pay for stay at time of admission  [facility] will contact patient and family to discuss admission process and cost  [facility] to contact CM with update

## 2018-03-26 ENCOUNTER — APPOINTMENT (OUTPATIENT)
Dept: LAB | Age: 63
End: 2018-03-26
Attending: PREVENTIVE MEDICINE
Payer: COMMERCIAL

## 2018-03-26 ENCOUNTER — TRANSCRIBE ORDERS (OUTPATIENT)
Dept: ADMINISTRATIVE | Age: 63
End: 2018-03-26

## 2018-03-26 DIAGNOSIS — Z76.89 RETURN TO WORK EXAM: Primary | ICD-10-CM

## 2018-03-26 PROCEDURE — 80307 DRUG TEST PRSMV CHEM ANLYZR: CPT | Performed by: PREVENTIVE MEDICINE

## 2018-03-27 LAB — ETHANOL UR-MCNC: NEGATIVE %

## 2018-03-29 LAB
AMPHETAMINES UR QL SCN: NEGATIVE NG/ML
BARBITURATES UR QL SCN: NEGATIVE NG/ML
BENZODIAZ UR QL SCN: NEGATIVE
BZE UR QL: NEGATIVE NG/ML
CANNABINOIDS UR QL SCN: NEGATIVE NG/ML
METHADONE UR QL SCN: NEGATIVE NG/ML
OPIATES UR QL: NEGATIVE NG/ML
PCP UR QL: NEGATIVE NG/ML
PROPOXYPH UR QL: NEGATIVE NG/ML